# Patient Record
Sex: FEMALE | Race: BLACK OR AFRICAN AMERICAN | Employment: OTHER | URBAN - METROPOLITAN AREA
[De-identification: names, ages, dates, MRNs, and addresses within clinical notes are randomized per-mention and may not be internally consistent; named-entity substitution may affect disease eponyms.]

---

## 2018-07-03 ENCOUNTER — HOSPITAL ENCOUNTER (OUTPATIENT)
Dept: ENDOSCOPY | Age: 73
Discharge: HOME | End: 2018-07-03
Attending: INTERNAL MEDICINE | Admitting: INTERNAL MEDICINE

## 2018-07-03 VITALS
RESPIRATION RATE: 18 BRPM | DIASTOLIC BLOOD PRESSURE: 56 MMHG | HEART RATE: 79 BPM | SYSTOLIC BLOOD PRESSURE: 154 MMHG | TEMPERATURE: 97.9 F | OXYGEN SATURATION: 98 % | BODY MASS INDEX: 24.16 KG/M2 | HEIGHT: 65 IN | WEIGHT: 145 LBS

## 2018-07-03 RX ORDER — PANTOPRAZOLE SODIUM 40 MG/1
1 TABLET, DELAYED RELEASE ORAL DAILY
Status: ON HOLD | COMMUNITY
Start: 2018-02-28 | End: 2019-10-15 | Stop reason: HOSPADM

## 2018-07-03 RX ORDER — FERROUS SULFATE 325(65) MG
1 TABLET ORAL
COMMUNITY
Start: 2018-02-27

## 2018-07-03 RX ORDER — SODIUM BICARBONATE 650 MG/1
2 TABLET ORAL 3 TIMES DAILY
COMMUNITY
Start: 2018-02-27

## 2018-07-03 RX ORDER — DIPHENHYDRAMINE HYDROCHLORIDE 50 MG/ML
12.5 INJECTION INTRAMUSCULAR; INTRAVENOUS
Status: ACTIVE | OUTPATIENT
Start: 2018-07-03 | End: 2018-07-03

## 2018-07-03 RX ORDER — OXYCODONE HYDROCHLORIDE 5 MG/1
10 TABLET ORAL PRN
Status: ACTIVE | OUTPATIENT
Start: 2018-07-03 | End: 2018-07-03

## 2018-07-03 RX ORDER — OXYCODONE HYDROCHLORIDE 5 MG/1
5 TABLET ORAL PRN
Status: ACTIVE | OUTPATIENT
Start: 2018-07-03 | End: 2018-07-03

## 2018-07-03 RX ORDER — PROMETHAZINE HYDROCHLORIDE 25 MG/ML
6.25 INJECTION, SOLUTION INTRAMUSCULAR; INTRAVENOUS
Status: ACTIVE | OUTPATIENT
Start: 2018-07-03 | End: 2018-07-03

## 2018-07-03 RX ORDER — ALLOPURINOL 100 MG/1
1 TABLET ORAL DAILY
COMMUNITY
Start: 2018-04-11

## 2018-07-03 RX ORDER — MORPHINE SULFATE 2 MG/ML
1 INJECTION, SOLUTION INTRAMUSCULAR; INTRAVENOUS EVERY 5 MIN PRN
Status: DISCONTINUED | OUTPATIENT
Start: 2018-07-03 | End: 2018-07-04 | Stop reason: HOSPADM

## 2018-07-03 RX ORDER — METOCLOPRAMIDE HYDROCHLORIDE 5 MG/ML
10 INJECTION INTRAMUSCULAR; INTRAVENOUS
Status: ACTIVE | OUTPATIENT
Start: 2018-07-03 | End: 2018-07-03

## 2018-07-03 RX ORDER — AMLODIPINE BESYLATE 10 MG/1
1 TABLET ORAL 2 TIMES DAILY
COMMUNITY
Start: 2018-02-27

## 2018-07-03 RX ORDER — MEPERIDINE HYDROCHLORIDE 25 MG/ML
12.5 INJECTION INTRAMUSCULAR; INTRAVENOUS; SUBCUTANEOUS EVERY 5 MIN PRN
Status: DISCONTINUED | OUTPATIENT
Start: 2018-07-03 | End: 2018-07-04 | Stop reason: HOSPADM

## 2018-07-03 RX ORDER — LABETALOL HYDROCHLORIDE 5 MG/ML
5 INJECTION, SOLUTION INTRAVENOUS EVERY 10 MIN PRN
Status: DISCONTINUED | OUTPATIENT
Start: 2018-07-03 | End: 2018-07-04 | Stop reason: HOSPADM

## 2018-07-03 RX ORDER — HYDRALAZINE HYDROCHLORIDE 20 MG/ML
5 INJECTION INTRAMUSCULAR; INTRAVENOUS EVERY 10 MIN PRN
Status: DISCONTINUED | OUTPATIENT
Start: 2018-07-03 | End: 2018-07-04 | Stop reason: HOSPADM

## 2018-07-03 NOTE — PROCEDURES
[facility]  ENDOSCOPIC ULTRASOUND (EUS) REPORT    Patient:  Oneta Face    Referring Physician:  Omero Montemayor     Indication:  Gastric nodule     Medications:  MAC      Pre-Anesthesia Assessment:  I have reviewed and am in agreement with patient history and medication, including previous response to sedation in the nursing record. A full history and physical and physical was performed. I discussed the risks and benefits of the procedure with the patient including but not limited to infection, bleeding, perforation, medication reaction, and death. In addition I discussed that fine needle aspiration may result in an increased risk of infection, bleeding, perforation, and pancreatitis. The patient elected to have the procedure performed and informed consent was obtained. The patient was brought to the room, a time out was performed and the patient and staff were in agreement that it was the correct patient and procedure. Mallampatti: II  ASA Grade Assessment:2    The plan was to use MAC anesthesia. Vital signs and heart rhythm were monitored prior to and throughout the entire procedure. The patient was reassessed and then adequate sedation was then provided in stepwise fashion. The heart rate, respiratory rate, oxygen saturations, blood pressure, adequacy of pulmonary ventilation, and response to care were monitored throughout the procedure. The physical status of the patient was re-assessed after the procedure. The gastroscope and Olympus radial echoendoscope were introduced through the mouth, and advanced to the second part of duodenum. The upper EUS was accomplished without difficulty. The patient tolerated the procedure well. An endosonoscope with 7. 5MHz processor and color doppler were used throughout the procedure.   I performed real time sonographic image interpretation without the assistance of a radiologist.     There were no immediate apparent

## 2019-03-15 ENCOUNTER — APPOINTMENT (OUTPATIENT)
Dept: GENERAL RADIOLOGY | Age: 74
End: 2019-03-15
Payer: MEDICARE

## 2019-03-15 LAB
RAPID INFLUENZA  B AGN: NEGATIVE
RAPID INFLUENZA A AGN: NEGATIVE

## 2019-03-15 PROCEDURE — 71046 X-RAY EXAM CHEST 2 VIEWS: CPT

## 2019-03-15 PROCEDURE — 87804 INFLUENZA ASSAY W/OPTIC: CPT

## 2019-03-15 PROCEDURE — 6370000000 HC RX 637 (ALT 250 FOR IP)

## 2019-03-15 RX ORDER — ACETAMINOPHEN 500 MG
1000 TABLET ORAL ONCE
Status: COMPLETED | OUTPATIENT
Start: 2019-03-15 | End: 2019-03-15

## 2019-03-15 RX ORDER — ACETAMINOPHEN 500 MG
TABLET ORAL
Status: COMPLETED
Start: 2019-03-15 | End: 2019-03-15

## 2019-03-15 RX ADMIN — ACETAMINOPHEN 1000 MG: 500 TABLET, FILM COATED ORAL at 23:33

## 2019-03-15 RX ADMIN — Medication 1000 MG: at 23:33

## 2019-03-15 ASSESSMENT — PAIN SCALES - GENERAL
PAINLEVEL_OUTOF10: 8
PAINLEVEL_OUTOF10: 8

## 2019-03-16 ENCOUNTER — APPOINTMENT (OUTPATIENT)
Dept: CT IMAGING | Age: 74
End: 2019-03-16
Payer: MEDICARE

## 2019-03-16 ENCOUNTER — HOSPITAL ENCOUNTER (OUTPATIENT)
Age: 74
Setting detail: OBSERVATION
Discharge: HOME | End: 2019-03-18
Attending: EMERGENCY MEDICINE | Admitting: INTERNAL MEDICINE
Payer: MEDICARE

## 2019-03-16 DIAGNOSIS — R09.02 HYPOXIA: ICD-10-CM

## 2019-03-16 DIAGNOSIS — J06.9 UPPER RESPIRATORY TRACT INFECTION, UNSPECIFIED TYPE: Primary | ICD-10-CM

## 2019-03-16 DIAGNOSIS — N18.9 CHRONIC KIDNEY DISEASE, UNSPECIFIED CKD STAGE: ICD-10-CM

## 2019-03-16 PROBLEM — R68.89 FLU-LIKE SYMPTOMS: Status: ACTIVE | Noted: 2019-03-16

## 2019-03-16 LAB
A/G RATIO: 1.2 (ref 1.1–2.2)
ALBUMIN SERPL-MCNC: 3.5 G/DL (ref 3.4–5)
ALP BLD-CCNC: 133 U/L (ref 40–129)
ALT SERPL-CCNC: 12 U/L (ref 10–40)
ANION GAP SERPL CALCULATED.3IONS-SCNC: 14 MMOL/L (ref 3–16)
AST SERPL-CCNC: 21 U/L (ref 15–37)
BASOPHILS ABSOLUTE: 0.2 K/UL (ref 0–0.2)
BASOPHILS RELATIVE PERCENT: 2.8 %
BILIRUB SERPL-MCNC: <0.2 MG/DL (ref 0–1)
BUN BLDV-MCNC: 31 MG/DL (ref 7–20)
CALCIUM SERPL-MCNC: 7.8 MG/DL (ref 8.3–10.6)
CHLORIDE BLD-SCNC: 102 MMOL/L (ref 99–110)
CO2: 20 MMOL/L (ref 21–32)
CREAT SERPL-MCNC: 2.6 MG/DL (ref 0.6–1.2)
EOSINOPHILS ABSOLUTE: 0.1 K/UL (ref 0–0.6)
EOSINOPHILS RELATIVE PERCENT: 1.6 %
GFR AFRICAN AMERICAN: 22
GFR NON-AFRICAN AMERICAN: 18
GLOBULIN: 2.9 G/DL
GLUCOSE BLD-MCNC: 100 MG/DL (ref 70–99)
HCT VFR BLD CALC: 28.1 % (ref 36–48)
HEMOGLOBIN: 9.1 G/DL (ref 12–16)
LACTIC ACID, SEPSIS: 0.6 MMOL/L (ref 0.4–1.9)
LYMPHOCYTES ABSOLUTE: 0.7 K/UL (ref 1–5.1)
LYMPHOCYTES RELATIVE PERCENT: 10.6 %
MCH RBC QN AUTO: 27.5 PG (ref 26–34)
MCHC RBC AUTO-ENTMCNC: 32.5 G/DL (ref 31–36)
MCV RBC AUTO: 84.5 FL (ref 80–100)
MONOCYTES ABSOLUTE: 0.8 K/UL (ref 0–1.3)
MONOCYTES RELATIVE PERCENT: 12.3 %
NEUTROPHILS ABSOLUTE: 4.7 K/UL (ref 1.7–7.7)
NEUTROPHILS RELATIVE PERCENT: 72.7 %
PDW BLD-RTO: 16 % (ref 12.4–15.4)
PLATELET # BLD: 209 K/UL (ref 135–450)
PMV BLD AUTO: 9.7 FL (ref 5–10.5)
POTASSIUM SERPL-SCNC: 4.2 MMOL/L (ref 3.5–5.1)
RBC # BLD: 3.33 M/UL (ref 4–5.2)
SODIUM BLD-SCNC: 136 MMOL/L (ref 136–145)
TOTAL PROTEIN: 6.4 G/DL (ref 6.4–8.2)
WBC # BLD: 6.5 K/UL (ref 4–11)

## 2019-03-16 PROCEDURE — 85025 COMPLETE CBC W/AUTO DIFF WBC: CPT

## 2019-03-16 PROCEDURE — 6370000000 HC RX 637 (ALT 250 FOR IP): Performed by: INTERNAL MEDICINE

## 2019-03-16 PROCEDURE — 99285 EMERGENCY DEPT VISIT HI MDM: CPT

## 2019-03-16 PROCEDURE — 83605 ASSAY OF LACTIC ACID: CPT

## 2019-03-16 PROCEDURE — 96360 HYDRATION IV INFUSION INIT: CPT

## 2019-03-16 PROCEDURE — 2580000003 HC RX 258: Performed by: NURSE PRACTITIONER

## 2019-03-16 PROCEDURE — 71250 CT THORAX DX C-: CPT

## 2019-03-16 PROCEDURE — G0378 HOSPITAL OBSERVATION PER HR: HCPCS

## 2019-03-16 PROCEDURE — 87040 BLOOD CULTURE FOR BACTERIA: CPT

## 2019-03-16 PROCEDURE — 6360000002 HC RX W HCPCS: Performed by: INTERNAL MEDICINE

## 2019-03-16 PROCEDURE — 2580000003 HC RX 258: Performed by: INTERNAL MEDICINE

## 2019-03-16 PROCEDURE — 96372 THER/PROPH/DIAG INJ SC/IM: CPT

## 2019-03-16 PROCEDURE — 80053 COMPREHEN METABOLIC PANEL: CPT

## 2019-03-16 RX ORDER — FERROUS SULFATE 325(65) MG
325 TABLET ORAL DAILY
Status: DISCONTINUED | OUTPATIENT
Start: 2019-03-16 | End: 2019-03-16

## 2019-03-16 RX ORDER — GUAIFENESIN/DEXTROMETHORPHAN 100-10MG/5
5 SYRUP ORAL 3 TIMES DAILY PRN
Qty: 120 ML | Refills: 0 | Status: SHIPPED | OUTPATIENT
Start: 2019-03-16 | End: 2019-03-18 | Stop reason: SDUPTHER

## 2019-03-16 RX ORDER — 0.9 % SODIUM CHLORIDE 0.9 %
1000 INTRAVENOUS SOLUTION INTRAVENOUS ONCE
Status: COMPLETED | OUTPATIENT
Start: 2019-03-16 | End: 2019-03-16

## 2019-03-16 RX ORDER — SODIUM CHLORIDE 9 MG/ML
INJECTION, SOLUTION INTRAVENOUS CONTINUOUS
Status: DISPENSED | OUTPATIENT
Start: 2019-03-16 | End: 2019-03-16

## 2019-03-16 RX ORDER — AMLODIPINE BESYLATE 5 MG/1
10 TABLET ORAL 2 TIMES DAILY
Status: DISCONTINUED | OUTPATIENT
Start: 2019-03-16 | End: 2019-03-18 | Stop reason: HOSPADM

## 2019-03-16 RX ORDER — SODIUM BICARBONATE 650 MG/1
1300 TABLET ORAL 3 TIMES DAILY
Status: DISCONTINUED | OUTPATIENT
Start: 2019-03-16 | End: 2019-03-18 | Stop reason: HOSPADM

## 2019-03-16 RX ORDER — ONDANSETRON 2 MG/ML
4 INJECTION INTRAMUSCULAR; INTRAVENOUS EVERY 6 HOURS PRN
Status: DISCONTINUED | OUTPATIENT
Start: 2019-03-16 | End: 2019-03-18 | Stop reason: HOSPADM

## 2019-03-16 RX ORDER — FERROUS SULFATE 325(65) MG
325 TABLET ORAL
Status: DISCONTINUED | OUTPATIENT
Start: 2019-03-16 | End: 2019-03-18 | Stop reason: HOSPADM

## 2019-03-16 RX ORDER — ACETAMINOPHEN 325 MG/1
650 TABLET ORAL EVERY 4 HOURS PRN
Status: DISCONTINUED | OUTPATIENT
Start: 2019-03-16 | End: 2019-03-18 | Stop reason: HOSPADM

## 2019-03-16 RX ORDER — PREDNISONE 10 MG/1
60 TABLET ORAL DAILY
Qty: 30 TABLET | Refills: 0 | Status: SHIPPED | OUTPATIENT
Start: 2019-03-16 | End: 2019-03-18 | Stop reason: HOSPADM

## 2019-03-16 RX ORDER — SODIUM CHLORIDE 0.9 % (FLUSH) 0.9 %
10 SYRINGE (ML) INJECTION PRN
Status: DISCONTINUED | OUTPATIENT
Start: 2019-03-16 | End: 2019-03-18 | Stop reason: HOSPADM

## 2019-03-16 RX ORDER — OSELTAMIVIR PHOSPHATE 30 MG/1
30 CAPSULE ORAL DAILY
Status: DISCONTINUED | OUTPATIENT
Start: 2019-03-16 | End: 2019-03-18 | Stop reason: HOSPADM

## 2019-03-16 RX ORDER — ALBUTEROL SULFATE 90 UG/1
2 AEROSOL, METERED RESPIRATORY (INHALATION) EVERY 4 HOURS PRN
Qty: 1 INHALER | Refills: 0 | Status: SHIPPED | OUTPATIENT
Start: 2019-03-16 | End: 2019-03-18 | Stop reason: HOSPADM

## 2019-03-16 RX ORDER — PANTOPRAZOLE SODIUM 40 MG/1
40 TABLET, DELAYED RELEASE ORAL DAILY
Status: DISCONTINUED | OUTPATIENT
Start: 2019-03-16 | End: 2019-03-18 | Stop reason: HOSPADM

## 2019-03-16 RX ORDER — LISINOPRIL 20 MG/1
40 TABLET ORAL DAILY
Status: DISCONTINUED | OUTPATIENT
Start: 2019-03-16 | End: 2019-03-18 | Stop reason: HOSPADM

## 2019-03-16 RX ORDER — ALLOPURINOL 100 MG/1
100 TABLET ORAL DAILY
Status: DISCONTINUED | OUTPATIENT
Start: 2019-03-16 | End: 2019-03-18 | Stop reason: HOSPADM

## 2019-03-16 RX ORDER — SODIUM CHLORIDE 0.9 % (FLUSH) 0.9 %
10 SYRINGE (ML) INJECTION EVERY 12 HOURS SCHEDULED
Status: DISCONTINUED | OUTPATIENT
Start: 2019-03-16 | End: 2019-03-18 | Stop reason: HOSPADM

## 2019-03-16 RX ORDER — ATORVASTATIN CALCIUM 40 MG/1
40 TABLET, FILM COATED ORAL DAILY
Status: DISCONTINUED | OUTPATIENT
Start: 2019-03-16 | End: 2019-03-18 | Stop reason: HOSPADM

## 2019-03-16 RX ADMIN — SODIUM CHLORIDE 1000 ML: 9 INJECTION, SOLUTION INTRAVENOUS at 01:09

## 2019-03-16 RX ADMIN — AMLODIPINE BESYLATE 10 MG: 5 TABLET ORAL at 21:38

## 2019-03-16 RX ADMIN — VITAMIN D, TAB 1000IU (100/BT) 2000 UNITS: 25 TAB at 11:14

## 2019-03-16 RX ADMIN — Medication 10 ML: at 11:14

## 2019-03-16 RX ADMIN — Medication 10 ML: at 21:39

## 2019-03-16 RX ADMIN — FERROUS SULFATE TAB 325 MG (65 MG ELEMENTAL FE) 325 MG: 325 (65 FE) TAB at 19:12

## 2019-03-16 RX ADMIN — PANTOPRAZOLE SODIUM 40 MG: 40 TABLET, DELAYED RELEASE ORAL at 11:14

## 2019-03-16 RX ADMIN — AMLODIPINE BESYLATE 10 MG: 5 TABLET ORAL at 11:13

## 2019-03-16 RX ADMIN — ACETAMINOPHEN 650 MG: 325 TABLET, FILM COATED ORAL at 14:29

## 2019-03-16 RX ADMIN — ENOXAPARIN SODIUM 30 MG: 30 INJECTION SUBCUTANEOUS at 11:14

## 2019-03-16 RX ADMIN — SODIUM CHLORIDE: 9 INJECTION, SOLUTION INTRAVENOUS at 11:15

## 2019-03-16 RX ADMIN — FERROUS SULFATE TAB 325 MG (65 MG ELEMENTAL FE) 325 MG: 325 (65 FE) TAB at 11:13

## 2019-03-16 RX ADMIN — OSELTAMIVIR PHOSPHATE 30 MG: 30 CAPSULE ORAL at 11:13

## 2019-03-16 RX ADMIN — SODIUM BICARBONATE 1300 MG: 650 TABLET ORAL at 21:38

## 2019-03-16 RX ADMIN — LISINOPRIL 40 MG: 20 TABLET ORAL at 11:13

## 2019-03-16 RX ADMIN — DESMOPRESSIN ACETATE 40 MG: 0.2 TABLET ORAL at 11:13

## 2019-03-16 RX ADMIN — SODIUM BICARBONATE 1300 MG: 650 TABLET ORAL at 11:13

## 2019-03-16 RX ADMIN — ALLOPURINOL 100 MG: 100 TABLET ORAL at 11:14

## 2019-03-16 RX ADMIN — SODIUM BICARBONATE 1300 MG: 650 TABLET ORAL at 14:29

## 2019-03-16 ASSESSMENT — ENCOUNTER SYMPTOMS
SHORTNESS OF BREATH: 1
NAUSEA: 0
ABDOMINAL PAIN: 0
DIARRHEA: 0
COUGH: 1
CHEST TIGHTNESS: 1
VOMITING: 0

## 2019-03-16 ASSESSMENT — PAIN SCALES - GENERAL
PAINLEVEL_OUTOF10: 0

## 2019-03-17 PROBLEM — I10 ESSENTIAL HYPERTENSION: Status: ACTIVE | Noted: 2019-03-17

## 2019-03-17 PROBLEM — N19 RENAL FAILURE: Status: ACTIVE | Noted: 2019-03-17

## 2019-03-17 PROBLEM — E78.5 HYPERLIPIDEMIA: Status: ACTIVE | Noted: 2019-03-17

## 2019-03-17 LAB
ANION GAP SERPL CALCULATED.3IONS-SCNC: 13 MMOL/L (ref 3–16)
BASOPHILS ABSOLUTE: 0.1 K/UL (ref 0–0.2)
BASOPHILS RELATIVE PERCENT: 1.1 %
BILIRUBIN URINE: NEGATIVE
BLOOD, URINE: ABNORMAL
BUN BLDV-MCNC: 28 MG/DL (ref 7–20)
CALCIUM SERPL-MCNC: 7.8 MG/DL (ref 8.3–10.6)
CHLORIDE BLD-SCNC: 107 MMOL/L (ref 99–110)
CLARITY: CLEAR
CO2: 21 MMOL/L (ref 21–32)
COLOR: YELLOW
CREAT SERPL-MCNC: 2.2 MG/DL (ref 0.6–1.2)
EOSINOPHILS ABSOLUTE: 0.1 K/UL (ref 0–0.6)
EOSINOPHILS RELATIVE PERCENT: 2.2 %
EPITHELIAL CELLS, UA: 1 /HPF (ref 0–5)
GFR AFRICAN AMERICAN: 26
GFR NON-AFRICAN AMERICAN: 22
GLUCOSE BLD-MCNC: 88 MG/DL (ref 70–99)
GLUCOSE URINE: NEGATIVE MG/DL
HCT VFR BLD CALC: 26.4 % (ref 36–48)
HEMOGLOBIN: 8.7 G/DL (ref 12–16)
HYALINE CASTS: 0 /LPF (ref 0–8)
KETONES, URINE: NEGATIVE MG/DL
LEUKOCYTE ESTERASE, URINE: NEGATIVE
LYMPHOCYTES ABSOLUTE: 1.5 K/UL (ref 1–5.1)
LYMPHOCYTES RELATIVE PERCENT: 30 %
MCH RBC QN AUTO: 27.7 PG (ref 26–34)
MCHC RBC AUTO-ENTMCNC: 32.8 G/DL (ref 31–36)
MCV RBC AUTO: 84.6 FL (ref 80–100)
MICROSCOPIC EXAMINATION: YES
MONOCYTES ABSOLUTE: 0.9 K/UL (ref 0–1.3)
MONOCYTES RELATIVE PERCENT: 16.6 %
NEUTROPHILS ABSOLUTE: 2.6 K/UL (ref 1.7–7.7)
NEUTROPHILS RELATIVE PERCENT: 50.1 %
NITRITE, URINE: NEGATIVE
PDW BLD-RTO: 16 % (ref 12.4–15.4)
PH UA: 7 (ref 5–8)
PLATELET # BLD: 184 K/UL (ref 135–450)
PMV BLD AUTO: 9.6 FL (ref 5–10.5)
POTASSIUM REFLEX MAGNESIUM: 4.2 MMOL/L (ref 3.5–5.1)
PROTEIN UA: >=300 MG/DL
RBC # BLD: 3.12 M/UL (ref 4–5.2)
RBC UA: 2 /HPF (ref 0–4)
SODIUM BLD-SCNC: 141 MMOL/L (ref 136–145)
SPECIFIC GRAVITY UA: 1.01 (ref 1–1.03)
TOTAL CK: 69 U/L (ref 26–192)
URINE TYPE: ABNORMAL
UROBILINOGEN, URINE: 0.2 E.U./DL
WBC # BLD: 5.1 K/UL (ref 4–11)
WBC UA: 0 /HPF (ref 0–5)

## 2019-03-17 PROCEDURE — 85025 COMPLETE CBC W/AUTO DIFF WBC: CPT

## 2019-03-17 PROCEDURE — 36415 COLL VENOUS BLD VENIPUNCTURE: CPT

## 2019-03-17 PROCEDURE — 80048 BASIC METABOLIC PNL TOTAL CA: CPT

## 2019-03-17 PROCEDURE — 6360000002 HC RX W HCPCS: Performed by: INTERNAL MEDICINE

## 2019-03-17 PROCEDURE — 6370000000 HC RX 637 (ALT 250 FOR IP): Performed by: INTERNAL MEDICINE

## 2019-03-17 PROCEDURE — 96372 THER/PROPH/DIAG INJ SC/IM: CPT

## 2019-03-17 PROCEDURE — G0378 HOSPITAL OBSERVATION PER HR: HCPCS

## 2019-03-17 PROCEDURE — 81001 URINALYSIS AUTO W/SCOPE: CPT

## 2019-03-17 PROCEDURE — 2580000003 HC RX 258: Performed by: INTERNAL MEDICINE

## 2019-03-17 PROCEDURE — 84300 ASSAY OF URINE SODIUM: CPT

## 2019-03-17 PROCEDURE — 84156 ASSAY OF PROTEIN URINE: CPT

## 2019-03-17 PROCEDURE — 82550 ASSAY OF CK (CPK): CPT

## 2019-03-17 PROCEDURE — 82570 ASSAY OF URINE CREATININE: CPT

## 2019-03-17 RX ADMIN — VITAMIN D, TAB 1000IU (100/BT) 2000 UNITS: 25 TAB at 09:51

## 2019-03-17 RX ADMIN — ALLOPURINOL 100 MG: 100 TABLET ORAL at 09:50

## 2019-03-17 RX ADMIN — LISINOPRIL 40 MG: 20 TABLET ORAL at 09:51

## 2019-03-17 RX ADMIN — ENOXAPARIN SODIUM 30 MG: 30 INJECTION SUBCUTANEOUS at 09:50

## 2019-03-17 RX ADMIN — SODIUM BICARBONATE 1300 MG: 650 TABLET ORAL at 22:25

## 2019-03-17 RX ADMIN — AMLODIPINE BESYLATE 10 MG: 5 TABLET ORAL at 22:26

## 2019-03-17 RX ADMIN — DESMOPRESSIN ACETATE 40 MG: 0.2 TABLET ORAL at 09:50

## 2019-03-17 RX ADMIN — Medication 10 ML: at 09:51

## 2019-03-17 RX ADMIN — FERROUS SULFATE TAB 325 MG (65 MG ELEMENTAL FE) 325 MG: 325 (65 FE) TAB at 17:46

## 2019-03-17 RX ADMIN — OSELTAMIVIR PHOSPHATE 30 MG: 30 CAPSULE ORAL at 09:54

## 2019-03-17 RX ADMIN — PANTOPRAZOLE SODIUM 40 MG: 40 TABLET, DELAYED RELEASE ORAL at 09:51

## 2019-03-17 RX ADMIN — Medication 10 ML: at 22:26

## 2019-03-17 RX ADMIN — FERROUS SULFATE TAB 325 MG (65 MG ELEMENTAL FE) 325 MG: 325 (65 FE) TAB at 09:50

## 2019-03-17 RX ADMIN — FERROUS SULFATE TAB 325 MG (65 MG ELEMENTAL FE) 325 MG: 325 (65 FE) TAB at 14:36

## 2019-03-17 RX ADMIN — AMLODIPINE BESYLATE 10 MG: 5 TABLET ORAL at 09:50

## 2019-03-17 RX ADMIN — SODIUM BICARBONATE 1300 MG: 650 TABLET ORAL at 14:36

## 2019-03-17 RX ADMIN — SODIUM BICARBONATE 1300 MG: 650 TABLET ORAL at 09:50

## 2019-03-18 VITALS
HEIGHT: 63 IN | WEIGHT: 150.7 LBS | TEMPERATURE: 98.5 F | RESPIRATION RATE: 18 BRPM | OXYGEN SATURATION: 96 % | SYSTOLIC BLOOD PRESSURE: 128 MMHG | DIASTOLIC BLOOD PRESSURE: 81 MMHG | BODY MASS INDEX: 26.7 KG/M2 | HEART RATE: 84 BPM

## 2019-03-18 LAB
ALBUMIN SERPL-MCNC: 3.1 G/DL (ref 3.4–5)
ANION GAP SERPL CALCULATED.3IONS-SCNC: 11 MMOL/L (ref 3–16)
BASOPHILS ABSOLUTE: 0.1 K/UL (ref 0–0.2)
BASOPHILS RELATIVE PERCENT: 1 %
BUN BLDV-MCNC: 27 MG/DL (ref 7–20)
CALCIUM SERPL-MCNC: 7.7 MG/DL (ref 8.3–10.6)
CHLORIDE BLD-SCNC: 108 MMOL/L (ref 99–110)
CO2: 22 MMOL/L (ref 21–32)
CREAT SERPL-MCNC: 1.9 MG/DL (ref 0.6–1.2)
CREATININE URINE: 56.9 MG/DL (ref 28–259)
EOSINOPHILS ABSOLUTE: 0.2 K/UL (ref 0–0.6)
EOSINOPHILS RELATIVE PERCENT: 3.7 %
GFR AFRICAN AMERICAN: 31
GFR NON-AFRICAN AMERICAN: 26
GLUCOSE BLD-MCNC: 88 MG/DL (ref 70–99)
HCT VFR BLD CALC: 27.3 % (ref 36–48)
HEMOGLOBIN: 8.8 G/DL (ref 12–16)
LYMPHOCYTES ABSOLUTE: 2.2 K/UL (ref 1–5.1)
LYMPHOCYTES RELATIVE PERCENT: 45.3 %
MCH RBC QN AUTO: 27 PG (ref 26–34)
MCHC RBC AUTO-ENTMCNC: 32.3 G/DL (ref 31–36)
MCV RBC AUTO: 83.4 FL (ref 80–100)
MONOCYTES ABSOLUTE: 0.6 K/UL (ref 0–1.3)
MONOCYTES RELATIVE PERCENT: 12.3 %
NEUTROPHILS ABSOLUTE: 1.9 K/UL (ref 1.7–7.7)
NEUTROPHILS RELATIVE PERCENT: 37.7 %
PDW BLD-RTO: 15.8 % (ref 12.4–15.4)
PHOSPHORUS: 3.9 MG/DL (ref 2.5–4.9)
PLATELET # BLD: 186 K/UL (ref 135–450)
PMV BLD AUTO: 9.6 FL (ref 5–10.5)
POTASSIUM SERPL-SCNC: 3.8 MMOL/L (ref 3.5–5.1)
PROTEIN PROTEIN: 202 MG/DL
RBC # BLD: 3.27 M/UL (ref 4–5.2)
SODIUM BLD-SCNC: 141 MMOL/L (ref 136–145)
SODIUM URINE: 57 MMOL/L
WBC # BLD: 4.9 K/UL (ref 4–11)

## 2019-03-18 PROCEDURE — 6360000002 HC RX W HCPCS: Performed by: INTERNAL MEDICINE

## 2019-03-18 PROCEDURE — 85025 COMPLETE CBC W/AUTO DIFF WBC: CPT

## 2019-03-18 PROCEDURE — 36415 COLL VENOUS BLD VENIPUNCTURE: CPT

## 2019-03-18 PROCEDURE — G0378 HOSPITAL OBSERVATION PER HR: HCPCS

## 2019-03-18 PROCEDURE — 2580000003 HC RX 258: Performed by: INTERNAL MEDICINE

## 2019-03-18 PROCEDURE — 96372 THER/PROPH/DIAG INJ SC/IM: CPT

## 2019-03-18 PROCEDURE — 6370000000 HC RX 637 (ALT 250 FOR IP): Performed by: INTERNAL MEDICINE

## 2019-03-18 PROCEDURE — 80069 RENAL FUNCTION PANEL: CPT

## 2019-03-18 RX ORDER — GUAIFENESIN/DEXTROMETHORPHAN 100-10MG/5
5 SYRUP ORAL 3 TIMES DAILY PRN
Qty: 120 ML | Refills: 0 | Status: SHIPPED | OUTPATIENT
Start: 2019-03-18 | End: 2019-03-28

## 2019-03-18 RX ORDER — OSELTAMIVIR PHOSPHATE 30 MG/1
30 CAPSULE ORAL DAILY
Qty: 3 CAPSULE | Refills: 0 | Status: SHIPPED | OUTPATIENT
Start: 2019-03-18 | End: 2019-03-21

## 2019-03-18 RX ORDER — VARENICLINE TARTRATE 25 MG
KIT ORAL
Qty: 1 EACH | Refills: 0 | Status: SHIPPED | OUTPATIENT
Start: 2019-03-18 | End: 2019-12-10

## 2019-03-18 RX ADMIN — VITAMIN D, TAB 1000IU (100/BT) 2000 UNITS: 25 TAB at 09:23

## 2019-03-18 RX ADMIN — LISINOPRIL 40 MG: 20 TABLET ORAL at 09:23

## 2019-03-18 RX ADMIN — SODIUM BICARBONATE 1300 MG: 650 TABLET ORAL at 13:15

## 2019-03-18 RX ADMIN — SODIUM BICARBONATE 1300 MG: 650 TABLET ORAL at 09:23

## 2019-03-18 RX ADMIN — FERROUS SULFATE TAB 325 MG (65 MG ELEMENTAL FE) 325 MG: 325 (65 FE) TAB at 13:16

## 2019-03-18 RX ADMIN — ENOXAPARIN SODIUM 30 MG: 30 INJECTION SUBCUTANEOUS at 09:22

## 2019-03-18 RX ADMIN — OSELTAMIVIR PHOSPHATE 30 MG: 30 CAPSULE ORAL at 09:23

## 2019-03-18 RX ADMIN — AMLODIPINE BESYLATE 10 MG: 5 TABLET ORAL at 09:23

## 2019-03-18 RX ADMIN — FERROUS SULFATE TAB 325 MG (65 MG ELEMENTAL FE) 325 MG: 325 (65 FE) TAB at 09:23

## 2019-03-18 RX ADMIN — Medication 10 ML: at 09:22

## 2019-03-18 RX ADMIN — ALLOPURINOL 100 MG: 100 TABLET ORAL at 09:23

## 2019-03-18 RX ADMIN — PANTOPRAZOLE SODIUM 40 MG: 40 TABLET, DELAYED RELEASE ORAL at 09:23

## 2019-03-18 ASSESSMENT — PAIN SCALES - GENERAL
PAINLEVEL_OUTOF10: 0
PAINLEVEL_OUTOF10: 0

## 2019-03-21 LAB
BLOOD CULTURE, ROUTINE: NORMAL
CULTURE, BLOOD 2: NORMAL

## 2019-10-05 ENCOUNTER — APPOINTMENT (OUTPATIENT)
Dept: CT IMAGING | Age: 74
DRG: 280 | End: 2019-10-05
Payer: MEDICARE

## 2019-10-05 ENCOUNTER — HOSPITAL ENCOUNTER (INPATIENT)
Age: 74
LOS: 10 days | Discharge: HOME | DRG: 280 | End: 2019-10-15
Attending: EMERGENCY MEDICINE | Admitting: INTERNAL MEDICINE
Payer: MEDICARE

## 2019-10-05 ENCOUNTER — APPOINTMENT (OUTPATIENT)
Dept: GENERAL RADIOLOGY | Age: 74
DRG: 280 | End: 2019-10-05
Payer: MEDICARE

## 2019-10-05 DIAGNOSIS — E86.0 DEHYDRATION: ICD-10-CM

## 2019-10-05 DIAGNOSIS — R77.8 ELEVATED TROPONIN: ICD-10-CM

## 2019-10-05 DIAGNOSIS — I21.4 NSTEMI (NON-ST ELEVATED MYOCARDIAL INFARCTION) (HCC): ICD-10-CM

## 2019-10-05 DIAGNOSIS — R19.7 NAUSEA VOMITING AND DIARRHEA: Primary | ICD-10-CM

## 2019-10-05 DIAGNOSIS — R11.2 NAUSEA VOMITING AND DIARRHEA: Primary | ICD-10-CM

## 2019-10-05 DIAGNOSIS — I95.9 HYPOTENSION, UNSPECIFIED HYPOTENSION TYPE: ICD-10-CM

## 2019-10-05 DIAGNOSIS — D35.00 ADRENAL ADENOMA, UNSPECIFIED LATERALITY: ICD-10-CM

## 2019-10-05 DIAGNOSIS — R94.31 ABNORMAL EKG: ICD-10-CM

## 2019-10-05 DIAGNOSIS — N17.9 ACUTE RENAL FAILURE SUPERIMPOSED ON STAGE 4 CHRONIC KIDNEY DISEASE, UNSPECIFIED ACUTE RENAL FAILURE TYPE (HCC): ICD-10-CM

## 2019-10-05 DIAGNOSIS — N18.4 ACUTE RENAL FAILURE SUPERIMPOSED ON STAGE 4 CHRONIC KIDNEY DISEASE, UNSPECIFIED ACUTE RENAL FAILURE TYPE (HCC): ICD-10-CM

## 2019-10-05 LAB
A/G RATIO: 0.9 (ref 1.1–2.2)
ALBUMIN SERPL-MCNC: 3.9 G/DL (ref 3.4–5)
ALP BLD-CCNC: 97 U/L (ref 40–129)
ALT SERPL-CCNC: 12 U/L (ref 10–40)
ANION GAP SERPL CALCULATED.3IONS-SCNC: 14 MMOL/L (ref 3–16)
APTT: 30.3 SEC (ref 26–36)
AST SERPL-CCNC: 63 U/L (ref 15–37)
BASOPHILS ABSOLUTE: 0 K/UL (ref 0–0.2)
BASOPHILS RELATIVE PERCENT: 0.4 %
BILIRUB SERPL-MCNC: 0.3 MG/DL (ref 0–1)
BUN BLDV-MCNC: 41 MG/DL (ref 7–20)
CALCIUM SERPL-MCNC: 8.6 MG/DL (ref 8.3–10.6)
CHLORIDE BLD-SCNC: 102 MMOL/L (ref 99–110)
CO2: 15 MMOL/L (ref 21–32)
CREAT SERPL-MCNC: 3.9 MG/DL (ref 0.6–1.2)
EKG ATRIAL RATE: 98 BPM
EKG DIAGNOSIS: NORMAL
EKG P AXIS: 71 DEGREES
EKG P-R INTERVAL: 160 MS
EKG Q-T INTERVAL: 428 MS
EKG QRS DURATION: 76 MS
EKG QTC CALCULATION (BAZETT): 546 MS
EKG R AXIS: 49 DEGREES
EKG T AXIS: 11 DEGREES
EKG VENTRICULAR RATE: 98 BPM
EOSINOPHILS ABSOLUTE: 0 K/UL (ref 0–0.6)
EOSINOPHILS RELATIVE PERCENT: 0.2 %
GFR AFRICAN AMERICAN: 14
GFR NON-AFRICAN AMERICAN: 11
GLOBULIN: 4.3 G/DL
GLUCOSE BLD-MCNC: 151 MG/DL (ref 70–99)
HCT VFR BLD CALC: 35 % (ref 36–48)
HEMOGLOBIN: 11 G/DL (ref 12–16)
LACTIC ACID, SEPSIS: 1.9 MMOL/L (ref 0.4–1.9)
LIPASE: 30 U/L (ref 13–60)
LYMPHOCYTES ABSOLUTE: 1 K/UL (ref 1–5.1)
LYMPHOCYTES RELATIVE PERCENT: 14.1 %
MCH RBC QN AUTO: 28.2 PG (ref 26–34)
MCHC RBC AUTO-ENTMCNC: 31.3 G/DL (ref 31–36)
MCV RBC AUTO: 90.1 FL (ref 80–100)
MONOCYTES ABSOLUTE: 0.6 K/UL (ref 0–1.3)
MONOCYTES RELATIVE PERCENT: 7.8 %
NEUTROPHILS ABSOLUTE: 5.7 K/UL (ref 1.7–7.7)
NEUTROPHILS RELATIVE PERCENT: 77.5 %
PDW BLD-RTO: 16.1 % (ref 12.4–15.4)
PLATELET # BLD: 230 K/UL (ref 135–450)
PMV BLD AUTO: 10.4 FL (ref 5–10.5)
POTASSIUM REFLEX MAGNESIUM: 5.2 MMOL/L (ref 3.5–5.1)
RAPID INFLUENZA  B AGN: NEGATIVE
RAPID INFLUENZA A AGN: NEGATIVE
RBC # BLD: 3.89 M/UL (ref 4–5.2)
SODIUM BLD-SCNC: 131 MMOL/L (ref 136–145)
TOTAL PROTEIN: 8.2 G/DL (ref 6.4–8.2)
TROPONIN: 0.62 NG/ML
TROPONIN: 0.73 NG/ML
WBC # BLD: 7.3 K/UL (ref 4–11)

## 2019-10-05 PROCEDURE — 96375 TX/PRO/DX INJ NEW DRUG ADDON: CPT

## 2019-10-05 PROCEDURE — 6360000002 HC RX W HCPCS: Performed by: INTERNAL MEDICINE

## 2019-10-05 PROCEDURE — 93005 ELECTROCARDIOGRAM TRACING: CPT | Performed by: EMERGENCY MEDICINE

## 2019-10-05 PROCEDURE — 6370000000 HC RX 637 (ALT 250 FOR IP): Performed by: EMERGENCY MEDICINE

## 2019-10-05 PROCEDURE — 87804 INFLUENZA ASSAY W/OPTIC: CPT

## 2019-10-05 PROCEDURE — 71045 X-RAY EXAM CHEST 1 VIEW: CPT

## 2019-10-05 PROCEDURE — 84484 ASSAY OF TROPONIN QUANT: CPT

## 2019-10-05 PROCEDURE — 2580000003 HC RX 258: Performed by: EMERGENCY MEDICINE

## 2019-10-05 PROCEDURE — 87040 BLOOD CULTURE FOR BACTERIA: CPT

## 2019-10-05 PROCEDURE — 83605 ASSAY OF LACTIC ACID: CPT

## 2019-10-05 PROCEDURE — 74176 CT ABD & PELVIS W/O CONTRAST: CPT

## 2019-10-05 PROCEDURE — 99285 EMERGENCY DEPT VISIT HI MDM: CPT

## 2019-10-05 PROCEDURE — 6360000002 HC RX W HCPCS: Performed by: PHYSICIAN ASSISTANT

## 2019-10-05 PROCEDURE — 96365 THER/PROPH/DIAG IV INF INIT: CPT

## 2019-10-05 PROCEDURE — 85025 COMPLETE CBC W/AUTO DIFF WBC: CPT

## 2019-10-05 PROCEDURE — 36415 COLL VENOUS BLD VENIPUNCTURE: CPT

## 2019-10-05 PROCEDURE — 1200000000 HC SEMI PRIVATE

## 2019-10-05 PROCEDURE — 6370000000 HC RX 637 (ALT 250 FOR IP): Performed by: PHYSICIAN ASSISTANT

## 2019-10-05 PROCEDURE — 83690 ASSAY OF LIPASE: CPT

## 2019-10-05 PROCEDURE — 96361 HYDRATE IV INFUSION ADD-ON: CPT

## 2019-10-05 PROCEDURE — 85730 THROMBOPLASTIN TIME PARTIAL: CPT

## 2019-10-05 PROCEDURE — 80053 COMPREHEN METABOLIC PANEL: CPT

## 2019-10-05 PROCEDURE — 93010 ELECTROCARDIOGRAM REPORT: CPT | Performed by: INTERNAL MEDICINE

## 2019-10-05 PROCEDURE — 2580000003 HC RX 258: Performed by: INTERNAL MEDICINE

## 2019-10-05 PROCEDURE — 6360000002 HC RX W HCPCS: Performed by: EMERGENCY MEDICINE

## 2019-10-05 RX ORDER — SODIUM CHLORIDE 0.9 % (FLUSH) 0.9 %
10 SYRINGE (ML) INJECTION PRN
Status: DISCONTINUED | OUTPATIENT
Start: 2019-10-05 | End: 2019-10-15 | Stop reason: HOSPADM

## 2019-10-05 RX ORDER — ONDANSETRON 2 MG/ML
4 INJECTION INTRAMUSCULAR; INTRAVENOUS EVERY 6 HOURS PRN
Status: DISCONTINUED | OUTPATIENT
Start: 2019-10-05 | End: 2019-10-06

## 2019-10-05 RX ORDER — FERROUS SULFATE 325(65) MG
325 TABLET ORAL
Status: DISCONTINUED | OUTPATIENT
Start: 2019-10-06 | End: 2019-10-15 | Stop reason: HOSPADM

## 2019-10-05 RX ORDER — HEPARIN SODIUM 10000 [USP'U]/100ML
18 INJECTION, SOLUTION INTRAVENOUS CONTINUOUS
Status: DISCONTINUED | OUTPATIENT
Start: 2019-10-05 | End: 2019-10-10

## 2019-10-05 RX ORDER — SODIUM CHLORIDE 0.9 % (FLUSH) 0.9 %
10 SYRINGE (ML) INJECTION EVERY 12 HOURS SCHEDULED
Status: DISCONTINUED | OUTPATIENT
Start: 2019-10-05 | End: 2019-10-15 | Stop reason: HOSPADM

## 2019-10-05 RX ORDER — HEPARIN SODIUM 1000 [USP'U]/ML
80 INJECTION, SOLUTION INTRAVENOUS; SUBCUTANEOUS PRN
Status: DISCONTINUED | OUTPATIENT
Start: 2019-10-05 | End: 2019-10-10

## 2019-10-05 RX ORDER — FUROSEMIDE 20 MG/1
20 TABLET ORAL 2 TIMES DAILY
Status: ON HOLD | COMMUNITY
End: 2019-10-15 | Stop reason: HOSPADM

## 2019-10-05 RX ORDER — ACETAMINOPHEN 325 MG/1
650 TABLET ORAL ONCE
Status: COMPLETED | OUTPATIENT
Start: 2019-10-05 | End: 2019-10-05

## 2019-10-05 RX ORDER — ONDANSETRON 2 MG/ML
4 INJECTION INTRAMUSCULAR; INTRAVENOUS ONCE
Status: COMPLETED | OUTPATIENT
Start: 2019-10-05 | End: 2019-10-05

## 2019-10-05 RX ORDER — ATORVASTATIN CALCIUM 40 MG/1
40 TABLET, FILM COATED ORAL DAILY
Status: DISCONTINUED | OUTPATIENT
Start: 2019-10-05 | End: 2019-10-05 | Stop reason: SDUPTHER

## 2019-10-05 RX ORDER — LISINOPRIL 20 MG/1
40 TABLET ORAL 2 TIMES DAILY
Status: DISCONTINUED | OUTPATIENT
Start: 2019-10-06 | End: 2019-10-07

## 2019-10-05 RX ORDER — ALLOPURINOL 100 MG/1
100 TABLET ORAL DAILY
Status: DISCONTINUED | OUTPATIENT
Start: 2019-10-06 | End: 2019-10-15 | Stop reason: HOSPADM

## 2019-10-05 RX ORDER — ATORVASTATIN CALCIUM 40 MG/1
40 TABLET, FILM COATED ORAL NIGHTLY
Status: DISCONTINUED | OUTPATIENT
Start: 2019-10-05 | End: 2019-10-05

## 2019-10-05 RX ORDER — 0.9 % SODIUM CHLORIDE 0.9 %
2052 INTRAVENOUS SOLUTION INTRAVENOUS ONCE
Status: COMPLETED | OUTPATIENT
Start: 2019-10-05 | End: 2019-10-05

## 2019-10-05 RX ORDER — PANTOPRAZOLE SODIUM 40 MG/1
40 TABLET, DELAYED RELEASE ORAL
Status: DISCONTINUED | OUTPATIENT
Start: 2019-10-06 | End: 2019-10-06

## 2019-10-05 RX ORDER — FERROUS SULFATE 325(65) MG
325 TABLET ORAL DAILY
Status: DISCONTINUED | OUTPATIENT
Start: 2019-10-06 | End: 2019-10-05

## 2019-10-05 RX ORDER — ONDANSETRON 2 MG/ML
4 INJECTION INTRAMUSCULAR; INTRAVENOUS EVERY 5 MIN PRN
Status: DISCONTINUED | OUTPATIENT
Start: 2019-10-05 | End: 2019-10-06

## 2019-10-05 RX ORDER — ASPIRIN 81 MG/1
81 TABLET, CHEWABLE ORAL DAILY
Status: DISCONTINUED | OUTPATIENT
Start: 2019-10-05 | End: 2019-10-15 | Stop reason: HOSPADM

## 2019-10-05 RX ORDER — ASPIRIN 325 MG
325 TABLET ORAL DAILY
Status: DISCONTINUED | OUTPATIENT
Start: 2019-10-05 | End: 2019-10-07

## 2019-10-05 RX ORDER — ATORVASTATIN CALCIUM 40 MG/1
40 TABLET, FILM COATED ORAL DAILY
Status: DISCONTINUED | OUTPATIENT
Start: 2019-10-06 | End: 2019-10-15 | Stop reason: HOSPADM

## 2019-10-05 RX ORDER — HEPARIN SODIUM 1000 [USP'U]/ML
80 INJECTION, SOLUTION INTRAVENOUS; SUBCUTANEOUS ONCE
Status: COMPLETED | OUTPATIENT
Start: 2019-10-05 | End: 2019-10-05

## 2019-10-05 RX ORDER — SODIUM CHLORIDE, SODIUM LACTATE, POTASSIUM CHLORIDE, CALCIUM CHLORIDE 600; 310; 30; 20 MG/100ML; MG/100ML; MG/100ML; MG/100ML
1000 INJECTION, SOLUTION INTRAVENOUS ONCE
Status: COMPLETED | OUTPATIENT
Start: 2019-10-05 | End: 2019-10-05

## 2019-10-05 RX ORDER — HEPARIN SODIUM 1000 [USP'U]/ML
40 INJECTION, SOLUTION INTRAVENOUS; SUBCUTANEOUS PRN
Status: DISCONTINUED | OUTPATIENT
Start: 2019-10-05 | End: 2019-10-10

## 2019-10-05 RX ORDER — AMLODIPINE BESYLATE 5 MG/1
10 TABLET ORAL 2 TIMES DAILY
Status: DISCONTINUED | OUTPATIENT
Start: 2019-10-05 | End: 2019-10-15 | Stop reason: HOSPADM

## 2019-10-05 RX ORDER — LISINOPRIL 20 MG/1
40 TABLET ORAL DAILY
Status: DISCONTINUED | OUTPATIENT
Start: 2019-10-05 | End: 2019-10-05

## 2019-10-05 RX ORDER — SODIUM BICARBONATE 650 MG/1
1300 TABLET ORAL 3 TIMES DAILY
Status: DISCONTINUED | OUTPATIENT
Start: 2019-10-06 | End: 2019-10-13

## 2019-10-05 RX ORDER — ASPIRIN 81 MG/1
324 TABLET, CHEWABLE ORAL ONCE
Status: COMPLETED | OUTPATIENT
Start: 2019-10-05 | End: 2019-10-05

## 2019-10-05 RX ADMIN — HEPARIN SODIUM 5470 UNITS: 1000 INJECTION, SOLUTION INTRAVENOUS; SUBCUTANEOUS at 18:00

## 2019-10-05 RX ADMIN — ACETAMINOPHEN 650 MG: 325 TABLET, FILM COATED ORAL at 16:05

## 2019-10-05 RX ADMIN — Medication 10 ML: at 21:42

## 2019-10-05 RX ADMIN — ASPIRIN 81 MG 324 MG: 81 TABLET ORAL at 17:59

## 2019-10-05 RX ADMIN — ONDANSETRON 4 MG: 2 INJECTION INTRAMUSCULAR; INTRAVENOUS at 16:05

## 2019-10-05 RX ADMIN — HEPARIN SODIUM 18 UNITS/KG/HR: 10000 INJECTION, SOLUTION INTRAVENOUS at 18:00

## 2019-10-05 RX ADMIN — SODIUM CHLORIDE, POTASSIUM CHLORIDE, SODIUM LACTATE AND CALCIUM CHLORIDE 1000 ML: 600; 310; 30; 20 INJECTION, SOLUTION INTRAVENOUS at 18:31

## 2019-10-05 RX ADMIN — SODIUM CHLORIDE 1000 ML: 9 INJECTION, SOLUTION INTRAVENOUS at 16:08

## 2019-10-05 RX ADMIN — Medication 10 ML: at 23:19

## 2019-10-05 RX ADMIN — ONDANSETRON 4 MG: 2 INJECTION INTRAMUSCULAR; INTRAVENOUS at 23:19

## 2019-10-05 ASSESSMENT — ENCOUNTER SYMPTOMS
COUGH: 1
NAUSEA: 1
DIARRHEA: 1
WHEEZING: 0
SHORTNESS OF BREATH: 0
RHINORRHEA: 0
VOMITING: 1
ABDOMINAL PAIN: 1

## 2019-10-05 ASSESSMENT — PAIN SCALES - GENERAL
PAINLEVEL_OUTOF10: 7
PAINLEVEL_OUTOF10: 10
PAINLEVEL_OUTOF10: 0

## 2019-10-05 ASSESSMENT — PAIN DESCRIPTION - LOCATION: LOCATION: ABDOMEN

## 2019-10-06 PROBLEM — R77.8 ELEVATED TROPONIN: Status: ACTIVE | Noted: 2019-10-06

## 2019-10-06 PROBLEM — N17.9 ACUTE RENAL FAILURE SUPERIMPOSED ON STAGE 4 CHRONIC KIDNEY DISEASE (HCC): Status: ACTIVE | Noted: 2019-10-06

## 2019-10-06 PROBLEM — N18.4 ACUTE RENAL FAILURE SUPERIMPOSED ON STAGE 4 CHRONIC KIDNEY DISEASE (HCC): Status: ACTIVE | Noted: 2019-10-06

## 2019-10-06 PROBLEM — R79.89 ELEVATED TROPONIN: Status: ACTIVE | Noted: 2019-10-06

## 2019-10-06 LAB
A/G RATIO: 1.2 (ref 1.1–2.2)
ALBUMIN SERPL-MCNC: 3.4 G/DL (ref 3.4–5)
ALP BLD-CCNC: 86 U/L (ref 40–129)
ALT SERPL-CCNC: 9 U/L (ref 10–40)
AMPHETAMINE SCREEN, URINE: NORMAL
ANION GAP SERPL CALCULATED.3IONS-SCNC: 14 MMOL/L (ref 3–16)
APTT: 114 SEC (ref 26–36)
APTT: 175.9 SEC (ref 26–36)
APTT: 46 SEC (ref 26–36)
APTT: 57.1 SEC (ref 26–36)
AST SERPL-CCNC: 23 U/L (ref 15–37)
BACTERIA: ABNORMAL /HPF
BARBITURATE SCREEN URINE: NORMAL
BENZODIAZEPINE SCREEN, URINE: NORMAL
BILIRUB SERPL-MCNC: <0.2 MG/DL (ref 0–1)
BILIRUBIN URINE: ABNORMAL
BLOOD, URINE: ABNORMAL
BUN BLDV-MCNC: 47 MG/DL (ref 7–20)
C DIFF TOXIN/ANTIGEN: NORMAL
CALCIUM SERPL-MCNC: 7.7 MG/DL (ref 8.3–10.6)
CANNABINOID SCREEN URINE: NORMAL
CHLORIDE BLD-SCNC: 107 MMOL/L (ref 99–110)
CHOLESTEROL, TOTAL: 129 MG/DL (ref 0–199)
CLARITY: ABNORMAL
CO2: 20 MMOL/L (ref 21–32)
COCAINE METABOLITE SCREEN URINE: NORMAL
COLOR: YELLOW
CREAT SERPL-MCNC: 3.8 MG/DL (ref 0.6–1.2)
EPITHELIAL CELLS, UA: 7 /HPF (ref 0–5)
GFR AFRICAN AMERICAN: 14
GFR NON-AFRICAN AMERICAN: 12
GLOBULIN: 2.8 G/DL
GLUCOSE BLD-MCNC: 104 MG/DL (ref 70–99)
GLUCOSE URINE: NEGATIVE MG/DL
HCT VFR BLD CALC: 23.6 % (ref 36–48)
HCT VFR BLD CALC: 24.3 % (ref 36–48)
HCT VFR BLD CALC: 25.5 % (ref 36–48)
HCT VFR BLD CALC: 26.9 % (ref 36–48)
HDLC SERPL-MCNC: 42 MG/DL (ref 40–60)
HEMOGLOBIN: 7.5 G/DL (ref 12–16)
HEMOGLOBIN: 8 G/DL (ref 12–16)
HEMOGLOBIN: 8.4 G/DL (ref 12–16)
HEMOGLOBIN: 8.4 G/DL (ref 12–16)
HYALINE CASTS: 6 /LPF (ref 0–8)
KETONES, URINE: NEGATIVE MG/DL
LACTIC ACID: 0.8 MMOL/L (ref 0.4–2)
LDL CHOLESTEROL CALCULATED: 68 MG/DL
LEUKOCYTE ESTERASE, URINE: NEGATIVE
Lab: NORMAL
MCH RBC QN AUTO: 29.1 PG (ref 26–34)
MCHC RBC AUTO-ENTMCNC: 32.8 G/DL (ref 31–36)
MCV RBC AUTO: 88.5 FL (ref 80–100)
METHADONE SCREEN, URINE: NORMAL
MICROSCOPIC EXAMINATION: YES
NITRITE, URINE: NEGATIVE
OCCULT BLOOD DIAGNOSTIC: ABNORMAL
OCCULT BLOOD, OTHER: ABNORMAL
OPIATE SCREEN URINE: NORMAL
OXYCODONE URINE: NORMAL
PDW BLD-RTO: 16 % (ref 12.4–15.4)
PH UA: 5
PH UA: 5 (ref 5–8)
PH, GASTRIC: ABNORMAL
PHENCYCLIDINE SCREEN URINE: NORMAL
PLATELET # BLD: 198 K/UL (ref 135–450)
PMV BLD AUTO: 9.4 FL (ref 5–10.5)
POTASSIUM REFLEX MAGNESIUM: 3.6 MMOL/L (ref 3.5–5.1)
PROPOXYPHENE SCREEN: NORMAL
PROTEIN UA: 100 MG/DL
RBC # BLD: 2.88 M/UL (ref 4–5.2)
RBC UA: ABNORMAL /HPF (ref 0–2)
SODIUM BLD-SCNC: 141 MMOL/L (ref 136–145)
SPECIFIC GRAVITY UA: 1.02 (ref 1–1.03)
TOTAL PROTEIN: 6.2 G/DL (ref 6.4–8.2)
TRIGL SERPL-MCNC: 96 MG/DL (ref 0–150)
TROPONIN: 0.59 NG/ML
URINE REFLEX TO CULTURE: ABNORMAL
URINE TYPE: ABNORMAL
UROBILINOGEN, URINE: 0.2 E.U./DL
VLDLC SERPL CALC-MCNC: 19 MG/DL
WBC # BLD: 7.3 K/UL (ref 4–11)
WBC UA: 3 /HPF (ref 0–5)

## 2019-10-06 PROCEDURE — 85027 COMPLETE CBC AUTOMATED: CPT

## 2019-10-06 PROCEDURE — G0328 FECAL BLOOD SCRN IMMUNOASSAY: HCPCS

## 2019-10-06 PROCEDURE — 6370000000 HC RX 637 (ALT 250 FOR IP): Performed by: INTERNAL MEDICINE

## 2019-10-06 PROCEDURE — 6360000002 HC RX W HCPCS: Performed by: INTERNAL MEDICINE

## 2019-10-06 PROCEDURE — 85730 THROMBOPLASTIN TIME PARTIAL: CPT

## 2019-10-06 PROCEDURE — 80061 LIPID PANEL: CPT

## 2019-10-06 PROCEDURE — 83036 HEMOGLOBIN GLYCOSYLATED A1C: CPT

## 2019-10-06 PROCEDURE — 6360000002 HC RX W HCPCS: Performed by: HOSPITALIST

## 2019-10-06 PROCEDURE — 85018 HEMOGLOBIN: CPT

## 2019-10-06 PROCEDURE — C9113 INJ PANTOPRAZOLE SODIUM, VIA: HCPCS | Performed by: INTERNAL MEDICINE

## 2019-10-06 PROCEDURE — 81001 URINALYSIS AUTO W/SCOPE: CPT

## 2019-10-06 PROCEDURE — 83605 ASSAY OF LACTIC ACID: CPT

## 2019-10-06 PROCEDURE — 36415 COLL VENOUS BLD VENIPUNCTURE: CPT

## 2019-10-06 PROCEDURE — 84484 ASSAY OF TROPONIN QUANT: CPT

## 2019-10-06 PROCEDURE — 85014 HEMATOCRIT: CPT

## 2019-10-06 PROCEDURE — 80053 COMPREHEN METABOLIC PANEL: CPT

## 2019-10-06 PROCEDURE — 87449 NOS EACH ORGANISM AG IA: CPT

## 2019-10-06 PROCEDURE — 99223 1ST HOSP IP/OBS HIGH 75: CPT | Performed by: INTERNAL MEDICINE

## 2019-10-06 PROCEDURE — 1200000000 HC SEMI PRIVATE

## 2019-10-06 PROCEDURE — 93005 ELECTROCARDIOGRAM TRACING: CPT | Performed by: INTERNAL MEDICINE

## 2019-10-06 PROCEDURE — 82271 OCCULT BLOOD OTHER SOURCES: CPT

## 2019-10-06 PROCEDURE — 2580000003 HC RX 258: Performed by: INTERNAL MEDICINE

## 2019-10-06 PROCEDURE — 87324 CLOSTRIDIUM AG IA: CPT

## 2019-10-06 PROCEDURE — 80307 DRUG TEST PRSMV CHEM ANLYZR: CPT

## 2019-10-06 PROCEDURE — 6360000002 HC RX W HCPCS

## 2019-10-06 PROCEDURE — 94760 N-INVAS EAR/PLS OXIMETRY 1: CPT

## 2019-10-06 RX ORDER — PROCHLORPERAZINE EDISYLATE 5 MG/ML
10 INJECTION INTRAMUSCULAR; INTRAVENOUS EVERY 6 HOURS PRN
Status: DISCONTINUED | OUTPATIENT
Start: 2019-10-06 | End: 2019-10-15 | Stop reason: HOSPADM

## 2019-10-06 RX ORDER — PANTOPRAZOLE SODIUM 40 MG/10ML
40 INJECTION, POWDER, LYOPHILIZED, FOR SOLUTION INTRAVENOUS 2 TIMES DAILY
Status: DISCONTINUED | OUTPATIENT
Start: 2019-10-06 | End: 2019-10-15 | Stop reason: HOSPADM

## 2019-10-06 RX ORDER — ONDANSETRON 2 MG/ML
INJECTION INTRAMUSCULAR; INTRAVENOUS
Status: COMPLETED
Start: 2019-10-06 | End: 2019-10-06

## 2019-10-06 RX ORDER — PROMETHAZINE HYDROCHLORIDE 25 MG/ML
6.25 INJECTION, SOLUTION INTRAMUSCULAR; INTRAVENOUS EVERY 6 HOURS PRN
Status: DISCONTINUED | OUTPATIENT
Start: 2019-10-06 | End: 2019-10-15 | Stop reason: HOSPADM

## 2019-10-06 RX ADMIN — PANTOPRAZOLE SODIUM 40 MG: 40 INJECTION, POWDER, FOR SOLUTION INTRAVENOUS at 02:48

## 2019-10-06 RX ADMIN — PANTOPRAZOLE SODIUM 40 MG: 40 INJECTION, POWDER, FOR SOLUTION INTRAVENOUS at 21:03

## 2019-10-06 RX ADMIN — LISINOPRIL 40 MG: 20 TABLET ORAL at 09:16

## 2019-10-06 RX ADMIN — AMLODIPINE BESYLATE 10 MG: 5 TABLET ORAL at 21:04

## 2019-10-06 RX ADMIN — AMLODIPINE BESYLATE 10 MG: 5 TABLET ORAL at 09:16

## 2019-10-06 RX ADMIN — Medication 10 ML: at 09:17

## 2019-10-06 RX ADMIN — FERROUS SULFATE TAB 325 MG (65 MG ELEMENTAL FE) 325 MG: 325 (65 FE) TAB at 09:16

## 2019-10-06 RX ADMIN — HEPARIN SODIUM 6 UNITS/KG/HR: 10000 INJECTION, SOLUTION INTRAVENOUS at 11:16

## 2019-10-06 RX ADMIN — DESMOPRESSIN ACETATE 40 MG: 0.2 TABLET ORAL at 09:16

## 2019-10-06 RX ADMIN — FERROUS SULFATE TAB 325 MG (65 MG ELEMENTAL FE) 325 MG: 325 (65 FE) TAB at 16:49

## 2019-10-06 RX ADMIN — PANTOPRAZOLE SODIUM 40 MG: 40 INJECTION, POWDER, FOR SOLUTION INTRAVENOUS at 09:15

## 2019-10-06 RX ADMIN — ALLOPURINOL 100 MG: 100 TABLET ORAL at 09:15

## 2019-10-06 RX ADMIN — Medication 10 ML: at 21:04

## 2019-10-06 RX ADMIN — FERROUS SULFATE TAB 325 MG (65 MG ELEMENTAL FE) 325 MG: 325 (65 FE) TAB at 12:34

## 2019-10-06 RX ADMIN — LISINOPRIL 40 MG: 20 TABLET ORAL at 21:05

## 2019-10-06 RX ADMIN — SODIUM BICARBONATE 650 MG TABLET 1300 MG: at 14:46

## 2019-10-06 RX ADMIN — ONDANSETRON 4 MG: 2 INJECTION INTRAMUSCULAR; INTRAVENOUS at 12:34

## 2019-10-06 RX ADMIN — VITAMIN D, TAB 1000IU (100/BT) 2000 UNITS: 25 TAB at 09:15

## 2019-10-06 RX ADMIN — SODIUM BICARBONATE 650 MG TABLET 1300 MG: at 21:04

## 2019-10-06 RX ADMIN — SODIUM BICARBONATE 650 MG TABLET 1300 MG: at 09:15

## 2019-10-06 RX ADMIN — PROMETHAZINE HYDROCHLORIDE 6.25 MG: 25 INJECTION INTRAMUSCULAR; INTRAVENOUS at 14:46

## 2019-10-06 RX ADMIN — ASPIRIN 81 MG 81 MG: 81 TABLET ORAL at 09:16

## 2019-10-06 ASSESSMENT — PAIN SCALES - GENERAL
PAINLEVEL_OUTOF10: 0

## 2019-10-07 ENCOUNTER — ANESTHESIA (OUTPATIENT)
Dept: ENDOSCOPY | Age: 74
DRG: 280 | End: 2019-10-07
Payer: MEDICARE

## 2019-10-07 ENCOUNTER — ANESTHESIA EVENT (OUTPATIENT)
Dept: ENDOSCOPY | Age: 74
DRG: 280 | End: 2019-10-07
Payer: MEDICARE

## 2019-10-07 VITALS
SYSTOLIC BLOOD PRESSURE: 98 MMHG | OXYGEN SATURATION: 98 % | DIASTOLIC BLOOD PRESSURE: 51 MMHG | RESPIRATION RATE: 23 BRPM

## 2019-10-07 LAB
ANION GAP SERPL CALCULATED.3IONS-SCNC: 17 MMOL/L (ref 3–16)
APTT: 51.6 SEC (ref 26–36)
APTT: 76.6 SEC (ref 26–36)
APTT: 96.7 SEC (ref 26–36)
BUN BLDV-MCNC: 52 MG/DL (ref 7–20)
CALCIUM SERPL-MCNC: 7.6 MG/DL (ref 8.3–10.6)
CHLORIDE BLD-SCNC: 102 MMOL/L (ref 99–110)
CO2: 20 MMOL/L (ref 21–32)
CREAT SERPL-MCNC: 4.2 MG/DL (ref 0.6–1.2)
EKG ATRIAL RATE: 91 BPM
EKG ATRIAL RATE: 94 BPM
EKG DIAGNOSIS: NORMAL
EKG DIAGNOSIS: NORMAL
EKG P AXIS: 56 DEGREES
EKG P AXIS: 73 DEGREES
EKG P-R INTERVAL: 148 MS
EKG P-R INTERVAL: 156 MS
EKG Q-T INTERVAL: 452 MS
EKG Q-T INTERVAL: 454 MS
EKG QRS DURATION: 76 MS
EKG QRS DURATION: 80 MS
EKG QTC CALCULATION (BAZETT): 558 MS
EKG QTC CALCULATION (BAZETT): 565 MS
EKG R AXIS: 45 DEGREES
EKG R AXIS: 49 DEGREES
EKG T AXIS: 30 DEGREES
EKG T AXIS: 84 DEGREES
EKG VENTRICULAR RATE: 91 BPM
EKG VENTRICULAR RATE: 94 BPM
ESTIMATED AVERAGE GLUCOSE: 108.3 MG/DL
GFR AFRICAN AMERICAN: 12
GFR NON-AFRICAN AMERICAN: 10
GLUCOSE BLD-MCNC: 94 MG/DL (ref 70–99)
HBA1C MFR BLD: 5.4 %
HCT VFR BLD CALC: 23.4 % (ref 36–48)
HCT VFR BLD CALC: 27.5 % (ref 36–48)
HEMOGLOBIN: 7.5 G/DL (ref 12–16)
HEMOGLOBIN: 8.3 G/DL (ref 12–16)
LEFT VENTRICULAR EJECTION FRACTION HIGH VALUE: 60 %
LEFT VENTRICULAR EJECTION FRACTION MODE: NORMAL
LV EF: 60 %
LVEF MODALITY: NORMAL
MAGNESIUM: 1.4 MG/DL (ref 1.8–2.4)
MCH RBC QN AUTO: 28.5 PG (ref 26–34)
MCHC RBC AUTO-ENTMCNC: 32.2 G/DL (ref 31–36)
MCV RBC AUTO: 88.4 FL (ref 80–100)
PDW BLD-RTO: 15.6 % (ref 12.4–15.4)
PLATELET # BLD: 189 K/UL (ref 135–450)
PMV BLD AUTO: 10.3 FL (ref 5–10.5)
POTASSIUM REFLEX MAGNESIUM: 3.5 MMOL/L (ref 3.5–5.1)
RBC # BLD: 2.64 M/UL (ref 4–5.2)
SODIUM BLD-SCNC: 139 MMOL/L (ref 136–145)
WBC # BLD: 8.2 K/UL (ref 4–11)

## 2019-10-07 PROCEDURE — 85027 COMPLETE CBC AUTOMATED: CPT

## 2019-10-07 PROCEDURE — 2580000003 HC RX 258: Performed by: INTERNAL MEDICINE

## 2019-10-07 PROCEDURE — 85730 THROMBOPLASTIN TIME PARTIAL: CPT

## 2019-10-07 PROCEDURE — 90686 IIV4 VACC NO PRSV 0.5 ML IM: CPT | Performed by: INTERNAL MEDICINE

## 2019-10-07 PROCEDURE — 6370000000 HC RX 637 (ALT 250 FOR IP): Performed by: INTERNAL MEDICINE

## 2019-10-07 PROCEDURE — 6360000002 HC RX W HCPCS: Performed by: NURSE ANESTHETIST, CERTIFIED REGISTERED

## 2019-10-07 PROCEDURE — 99222 1ST HOSP IP/OBS MODERATE 55: CPT | Performed by: INTERNAL MEDICINE

## 2019-10-07 PROCEDURE — 2720000010 HC SURG SUPPLY STERILE: Performed by: INTERNAL MEDICINE

## 2019-10-07 PROCEDURE — 36415 COLL VENOUS BLD VENIPUNCTURE: CPT

## 2019-10-07 PROCEDURE — 7100000001 HC PACU RECOVERY - ADDTL 15 MIN: Performed by: INTERNAL MEDICINE

## 2019-10-07 PROCEDURE — 93005 ELECTROCARDIOGRAM TRACING: CPT | Performed by: ANESTHESIOLOGY

## 2019-10-07 PROCEDURE — 84300 ASSAY OF URINE SODIUM: CPT

## 2019-10-07 PROCEDURE — C9113 INJ PANTOPRAZOLE SODIUM, VIA: HCPCS | Performed by: INTERNAL MEDICINE

## 2019-10-07 PROCEDURE — 3609017100 HC EGD: Performed by: INTERNAL MEDICINE

## 2019-10-07 PROCEDURE — 2500000003 HC RX 250 WO HCPCS: Performed by: NURSE ANESTHETIST, CERTIFIED REGISTERED

## 2019-10-07 PROCEDURE — 6360000002 HC RX W HCPCS: Performed by: INTERNAL MEDICINE

## 2019-10-07 PROCEDURE — 3700000001 HC ADD 15 MINUTES (ANESTHESIA): Performed by: INTERNAL MEDICINE

## 2019-10-07 PROCEDURE — 2709999900 HC NON-CHARGEABLE SUPPLY: Performed by: INTERNAL MEDICINE

## 2019-10-07 PROCEDURE — 93306 TTE W/DOPPLER COMPLETE: CPT

## 2019-10-07 PROCEDURE — G0008 ADMIN INFLUENZA VIRUS VAC: HCPCS | Performed by: INTERNAL MEDICINE

## 2019-10-07 PROCEDURE — 94760 N-INVAS EAR/PLS OXIMETRY 1: CPT

## 2019-10-07 PROCEDURE — 84156 ASSAY OF PROTEIN URINE: CPT

## 2019-10-07 PROCEDURE — 80048 BASIC METABOLIC PNL TOTAL CA: CPT

## 2019-10-07 PROCEDURE — 3700000000 HC ANESTHESIA ATTENDED CARE: Performed by: INTERNAL MEDICINE

## 2019-10-07 PROCEDURE — 82570 ASSAY OF URINE CREATININE: CPT

## 2019-10-07 PROCEDURE — 0D568ZZ DESTRUCTION OF STOMACH, VIA NATURAL OR ARTIFICIAL OPENING ENDOSCOPIC: ICD-10-PCS | Performed by: INTERNAL MEDICINE

## 2019-10-07 PROCEDURE — 85014 HEMATOCRIT: CPT

## 2019-10-07 PROCEDURE — 83735 ASSAY OF MAGNESIUM: CPT

## 2019-10-07 PROCEDURE — 1200000000 HC SEMI PRIVATE

## 2019-10-07 PROCEDURE — 85018 HEMOGLOBIN: CPT

## 2019-10-07 PROCEDURE — 2580000003 HC RX 258: Performed by: NURSE ANESTHETIST, CERTIFIED REGISTERED

## 2019-10-07 PROCEDURE — 7100000000 HC PACU RECOVERY - FIRST 15 MIN: Performed by: INTERNAL MEDICINE

## 2019-10-07 RX ORDER — PROPOFOL 10 MG/ML
INJECTION, EMULSION INTRAVENOUS PRN
Status: DISCONTINUED | OUTPATIENT
Start: 2019-10-07 | End: 2019-10-07

## 2019-10-07 RX ORDER — SODIUM CHLORIDE 9 MG/ML
INJECTION, SOLUTION INTRAVENOUS CONTINUOUS
Status: DISCONTINUED | OUTPATIENT
Start: 2019-10-07 | End: 2019-10-10

## 2019-10-07 RX ORDER — MAGNESIUM SULFATE 1 G/100ML
1 INJECTION INTRAVENOUS PRN
Status: DISCONTINUED | OUTPATIENT
Start: 2019-10-07 | End: 2019-10-15 | Stop reason: HOSPADM

## 2019-10-07 RX ORDER — LIDOCAINE HYDROCHLORIDE 20 MG/ML
INJECTION, SOLUTION EPIDURAL; INFILTRATION; INTRACAUDAL; PERINEURAL PRN
Status: DISCONTINUED | OUTPATIENT
Start: 2019-10-07 | End: 2019-10-07 | Stop reason: SDUPTHER

## 2019-10-07 RX ORDER — MAGNESIUM SULFATE IN WATER 40 MG/ML
2 INJECTION, SOLUTION INTRAVENOUS ONCE
Status: COMPLETED | OUTPATIENT
Start: 2019-10-07 | End: 2019-10-07

## 2019-10-07 RX ORDER — PROPOFOL 10 MG/ML
INJECTION, EMULSION INTRAVENOUS PRN
Status: DISCONTINUED | OUTPATIENT
Start: 2019-10-07 | End: 2019-10-07 | Stop reason: SDUPTHER

## 2019-10-07 RX ORDER — SODIUM CHLORIDE 9 MG/ML
INJECTION, SOLUTION INTRAVENOUS CONTINUOUS PRN
Status: DISCONTINUED | OUTPATIENT
Start: 2019-10-07 | End: 2019-10-07 | Stop reason: SDUPTHER

## 2019-10-07 RX ADMIN — ASPIRIN 81 MG 81 MG: 81 TABLET ORAL at 11:20

## 2019-10-07 RX ADMIN — SODIUM BICARBONATE 650 MG TABLET 1300 MG: at 10:31

## 2019-10-07 RX ADMIN — DESMOPRESSIN ACETATE 40 MG: 0.2 TABLET ORAL at 10:34

## 2019-10-07 RX ADMIN — SODIUM BICARBONATE 650 MG TABLET 1300 MG: at 15:39

## 2019-10-07 RX ADMIN — Medication 10 ML: at 10:34

## 2019-10-07 RX ADMIN — PROPOFOL 60 MG: 10 INJECTION, EMULSION INTRAVENOUS at 09:14

## 2019-10-07 RX ADMIN — INFLUENZA A VIRUS A/BRISBANE/02/2018 IVR-190 (H1N1) ANTIGEN (PROPIOLACTONE INACTIVATED), INFLUENZA A VIRUS A/KANSAS/14/2017 X-327 (H3N2) ANTIGEN (PROPIOLACTONE INACTIVATED), INFLUENZA B VIRUS B/MARYLAND/15/2016 ANTIGEN (PROPIOLACTONE INACTIVATED), INFLUENZA B VIRUS B/PHUKET/3073/2013 BVR-1B ANTIGEN (PROPIOLACTONE INACTIVATED) 0.5 ML: 15; 15; 15; 15 INJECTION, SUSPENSION INTRAMUSCULAR at 23:54

## 2019-10-07 RX ADMIN — FERROUS SULFATE TAB 325 MG (65 MG ELEMENTAL FE) 325 MG: 325 (65 FE) TAB at 10:31

## 2019-10-07 RX ADMIN — LIDOCAINE HYDROCHLORIDE 100 MG: 20 INJECTION, SOLUTION EPIDURAL; INFILTRATION; INTRACAUDAL; PERINEURAL at 09:10

## 2019-10-07 RX ADMIN — SODIUM CHLORIDE: 9 INJECTION, SOLUTION INTRAVENOUS at 09:04

## 2019-10-07 RX ADMIN — AMLODIPINE BESYLATE 10 MG: 5 TABLET ORAL at 21:21

## 2019-10-07 RX ADMIN — VITAMIN D, TAB 1000IU (100/BT) 2000 UNITS: 25 TAB at 10:30

## 2019-10-07 RX ADMIN — FERROUS SULFATE TAB 325 MG (65 MG ELEMENTAL FE) 325 MG: 325 (65 FE) TAB at 18:23

## 2019-10-07 RX ADMIN — HEPARIN SODIUM 8 UNITS/KG/HR: 10000 INJECTION, SOLUTION INTRAVENOUS at 01:19

## 2019-10-07 RX ADMIN — Medication 10 ML: at 21:21

## 2019-10-07 RX ADMIN — MAGNESIUM SULFATE HEPTAHYDRATE 2 G: 40 INJECTION, SOLUTION INTRAVENOUS at 11:33

## 2019-10-07 RX ADMIN — SODIUM CHLORIDE: 9 INJECTION, SOLUTION INTRAVENOUS at 10:55

## 2019-10-07 RX ADMIN — ALLOPURINOL 100 MG: 100 TABLET ORAL at 10:31

## 2019-10-07 RX ADMIN — FERROUS SULFATE TAB 325 MG (65 MG ELEMENTAL FE) 325 MG: 325 (65 FE) TAB at 11:33

## 2019-10-07 RX ADMIN — SODIUM BICARBONATE 650 MG TABLET 1300 MG: at 21:21

## 2019-10-07 RX ADMIN — PANTOPRAZOLE SODIUM 40 MG: 40 INJECTION, POWDER, FOR SOLUTION INTRAVENOUS at 21:21

## 2019-10-07 RX ADMIN — HEPARIN SODIUM 2740 UNITS: 1000 INJECTION, SOLUTION INTRAVENOUS; SUBCUTANEOUS at 01:17

## 2019-10-07 RX ADMIN — PROPOFOL 60 MG: 10 INJECTION, EMULSION INTRAVENOUS at 09:10

## 2019-10-07 RX ADMIN — AMLODIPINE BESYLATE 10 MG: 5 TABLET ORAL at 10:30

## 2019-10-07 RX ADMIN — PANTOPRAZOLE SODIUM 40 MG: 40 INJECTION, POWDER, FOR SOLUTION INTRAVENOUS at 10:34

## 2019-10-07 RX ADMIN — HEPARIN SODIUM 2740 UNITS: 1000 INJECTION, SOLUTION INTRAVENOUS; SUBCUTANEOUS at 15:46

## 2019-10-07 ASSESSMENT — PAIN SCALES - GENERAL
PAINLEVEL_OUTOF10: 0

## 2019-10-08 LAB
ABO/RH: NORMAL
ALBUMIN SERPL-MCNC: 3 G/DL (ref 3.4–5)
ANION GAP SERPL CALCULATED.3IONS-SCNC: 11 MMOL/L (ref 3–16)
ANTIBODY SCREEN: NORMAL
APTT: 53.1 SEC (ref 26–36)
APTT: 88.2 SEC (ref 26–36)
BLOOD BANK DISPENSE STATUS: NORMAL
BLOOD BANK DISPENSE STATUS: NORMAL
BLOOD BANK PRODUCT CODE: NORMAL
BLOOD BANK PRODUCT CODE: NORMAL
BPU ID: NORMAL
BPU ID: NORMAL
BUN BLDV-MCNC: 48 MG/DL (ref 7–20)
CALCIUM SERPL-MCNC: 7.7 MG/DL (ref 8.3–10.6)
CHLORIDE BLD-SCNC: 109 MMOL/L (ref 99–110)
CO2: 21 MMOL/L (ref 21–32)
CREAT SERPL-MCNC: 3.7 MG/DL (ref 0.6–1.2)
CREATININE URINE: 102.9 MG/DL (ref 28–259)
DESCRIPTION BLOOD BANK: NORMAL
DESCRIPTION BLOOD BANK: NORMAL
GFR AFRICAN AMERICAN: 14
GFR NON-AFRICAN AMERICAN: 12
GLUCOSE BLD-MCNC: 92 MG/DL (ref 70–99)
HCT VFR BLD CALC: 21.4 % (ref 36–48)
HEMOGLOBIN: 6.8 G/DL (ref 12–16)
MAGNESIUM: 1.8 MG/DL (ref 1.8–2.4)
PHOSPHORUS: 4.3 MG/DL (ref 2.5–4.9)
POTASSIUM SERPL-SCNC: 3.5 MMOL/L (ref 3.5–5.1)
PROTEIN PROTEIN: 26 MG/DL
SODIUM BLD-SCNC: 141 MMOL/L (ref 136–145)
SODIUM URINE: 32 MMOL/L

## 2019-10-08 PROCEDURE — 99232 SBSQ HOSP IP/OBS MODERATE 35: CPT | Performed by: INTERNAL MEDICINE

## 2019-10-08 PROCEDURE — 1200000000 HC SEMI PRIVATE

## 2019-10-08 PROCEDURE — 86900 BLOOD TYPING SEROLOGIC ABO: CPT

## 2019-10-08 PROCEDURE — P9016 RBC LEUKOCYTES REDUCED: HCPCS

## 2019-10-08 PROCEDURE — 80069 RENAL FUNCTION PANEL: CPT

## 2019-10-08 PROCEDURE — 6370000000 HC RX 637 (ALT 250 FOR IP): Performed by: INTERNAL MEDICINE

## 2019-10-08 PROCEDURE — 6360000002 HC RX W HCPCS: Performed by: INTERNAL MEDICINE

## 2019-10-08 PROCEDURE — 86901 BLOOD TYPING SEROLOGIC RH(D): CPT

## 2019-10-08 PROCEDURE — 36415 COLL VENOUS BLD VENIPUNCTURE: CPT

## 2019-10-08 PROCEDURE — 83735 ASSAY OF MAGNESIUM: CPT

## 2019-10-08 PROCEDURE — 86850 RBC ANTIBODY SCREEN: CPT

## 2019-10-08 PROCEDURE — 85730 THROMBOPLASTIN TIME PARTIAL: CPT

## 2019-10-08 PROCEDURE — 85018 HEMOGLOBIN: CPT

## 2019-10-08 PROCEDURE — 36430 TRANSFUSION BLD/BLD COMPNT: CPT

## 2019-10-08 PROCEDURE — C9113 INJ PANTOPRAZOLE SODIUM, VIA: HCPCS | Performed by: INTERNAL MEDICINE

## 2019-10-08 PROCEDURE — 86923 COMPATIBILITY TEST ELECTRIC: CPT

## 2019-10-08 PROCEDURE — 2580000003 HC RX 258: Performed by: INTERNAL MEDICINE

## 2019-10-08 PROCEDURE — 85014 HEMATOCRIT: CPT

## 2019-10-08 RX ORDER — 0.9 % SODIUM CHLORIDE 0.9 %
250 INTRAVENOUS SOLUTION INTRAVENOUS ONCE
Status: COMPLETED | OUTPATIENT
Start: 2019-10-08 | End: 2019-10-08

## 2019-10-08 RX ADMIN — ASPIRIN 81 MG 81 MG: 81 TABLET ORAL at 08:49

## 2019-10-08 RX ADMIN — DESMOPRESSIN ACETATE 40 MG: 0.2 TABLET ORAL at 08:50

## 2019-10-08 RX ADMIN — HEPARIN SODIUM 9.06 UNITS/KG/HR: 10000 INJECTION, SOLUTION INTRAVENOUS at 22:19

## 2019-10-08 RX ADMIN — SODIUM BICARBONATE 650 MG TABLET 1300 MG: at 08:49

## 2019-10-08 RX ADMIN — VITAMIN D, TAB 1000IU (100/BT) 2000 UNITS: 25 TAB at 08:50

## 2019-10-08 RX ADMIN — FERROUS SULFATE TAB 325 MG (65 MG ELEMENTAL FE) 325 MG: 325 (65 FE) TAB at 20:55

## 2019-10-08 RX ADMIN — PANTOPRAZOLE SODIUM 40 MG: 40 INJECTION, POWDER, FOR SOLUTION INTRAVENOUS at 08:50

## 2019-10-08 RX ADMIN — HEPARIN SODIUM 2740 UNITS: 1000 INJECTION, SOLUTION INTRAVENOUS; SUBCUTANEOUS at 04:18

## 2019-10-08 RX ADMIN — SODIUM CHLORIDE 250 ML: 9 INJECTION, SOLUTION INTRAVENOUS at 10:37

## 2019-10-08 RX ADMIN — AMLODIPINE BESYLATE 10 MG: 5 TABLET ORAL at 20:55

## 2019-10-08 RX ADMIN — AMLODIPINE BESYLATE 10 MG: 5 TABLET ORAL at 08:50

## 2019-10-08 RX ADMIN — SODIUM BICARBONATE 650 MG TABLET 1300 MG: at 20:55

## 2019-10-08 RX ADMIN — PANTOPRAZOLE SODIUM 40 MG: 40 INJECTION, POWDER, FOR SOLUTION INTRAVENOUS at 20:55

## 2019-10-08 RX ADMIN — Medication 10 ML: at 20:56

## 2019-10-08 RX ADMIN — FERROUS SULFATE TAB 325 MG (65 MG ELEMENTAL FE) 325 MG: 325 (65 FE) TAB at 08:49

## 2019-10-08 ASSESSMENT — PAIN SCALES - GENERAL
PAINLEVEL_OUTOF10: 0

## 2019-10-09 LAB
ALBUMIN SERPL-MCNC: 3.3 G/DL (ref 3.4–5)
ANION GAP SERPL CALCULATED.3IONS-SCNC: 14 MMOL/L (ref 3–16)
APTT: 50.8 SEC (ref 26–36)
APTT: 66.1 SEC (ref 26–36)
APTT: 86.8 SEC (ref 26–36)
BUN BLDV-MCNC: 37 MG/DL (ref 7–20)
CALCIUM SERPL-MCNC: 8.4 MG/DL (ref 8.3–10.6)
CHLORIDE BLD-SCNC: 108 MMOL/L (ref 99–110)
CO2: 21 MMOL/L (ref 21–32)
CREAT SERPL-MCNC: 2.7 MG/DL (ref 0.6–1.2)
GFR AFRICAN AMERICAN: 21
GFR NON-AFRICAN AMERICAN: 17
GLUCOSE BLD-MCNC: 103 MG/DL (ref 70–99)
HCT VFR BLD CALC: 23.2 % (ref 36–48)
HCT VFR BLD CALC: 26.5 % (ref 36–48)
HCT VFR BLD CALC: 27.1 % (ref 36–48)
HCT VFR BLD CALC: 29.9 % (ref 36–48)
HEMOGLOBIN: 7.5 G/DL (ref 12–16)
HEMOGLOBIN: 8.4 G/DL (ref 12–16)
HEMOGLOBIN: 8.8 G/DL (ref 12–16)
HEMOGLOBIN: 9.6 G/DL (ref 12–16)
PHOSPHORUS: 3.3 MG/DL (ref 2.5–4.9)
POTASSIUM SERPL-SCNC: 3.6 MMOL/L (ref 3.5–5.1)
SODIUM BLD-SCNC: 143 MMOL/L (ref 136–145)

## 2019-10-09 PROCEDURE — 85730 THROMBOPLASTIN TIME PARTIAL: CPT

## 2019-10-09 PROCEDURE — 6360000002 HC RX W HCPCS: Performed by: INTERNAL MEDICINE

## 2019-10-09 PROCEDURE — 36415 COLL VENOUS BLD VENIPUNCTURE: CPT

## 2019-10-09 PROCEDURE — C9113 INJ PANTOPRAZOLE SODIUM, VIA: HCPCS | Performed by: INTERNAL MEDICINE

## 2019-10-09 PROCEDURE — 99232 SBSQ HOSP IP/OBS MODERATE 35: CPT | Performed by: INTERNAL MEDICINE

## 2019-10-09 PROCEDURE — 2580000003 HC RX 258: Performed by: INTERNAL MEDICINE

## 2019-10-09 PROCEDURE — 85014 HEMATOCRIT: CPT

## 2019-10-09 PROCEDURE — 1200000000 HC SEMI PRIVATE

## 2019-10-09 PROCEDURE — 6370000000 HC RX 637 (ALT 250 FOR IP): Performed by: INTERNAL MEDICINE

## 2019-10-09 PROCEDURE — 80069 RENAL FUNCTION PANEL: CPT

## 2019-10-09 PROCEDURE — 85018 HEMOGLOBIN: CPT

## 2019-10-09 RX ADMIN — Medication 10 ML: at 09:13

## 2019-10-09 RX ADMIN — Medication 10 ML: at 21:09

## 2019-10-09 RX ADMIN — HEPARIN SODIUM 2740 UNITS: 1000 INJECTION, SOLUTION INTRAVENOUS; SUBCUTANEOUS at 01:40

## 2019-10-09 RX ADMIN — PANTOPRAZOLE SODIUM 40 MG: 40 INJECTION, POWDER, FOR SOLUTION INTRAVENOUS at 09:13

## 2019-10-09 RX ADMIN — HEPARIN SODIUM 11 UNITS/KG/HR: 10000 INJECTION, SOLUTION INTRAVENOUS at 01:40

## 2019-10-09 RX ADMIN — FERROUS SULFATE TAB 325 MG (65 MG ELEMENTAL FE) 325 MG: 325 (65 FE) TAB at 09:19

## 2019-10-09 RX ADMIN — DESMOPRESSIN ACETATE 40 MG: 0.2 TABLET ORAL at 09:13

## 2019-10-09 RX ADMIN — FERROUS SULFATE TAB 325 MG (65 MG ELEMENTAL FE) 325 MG: 325 (65 FE) TAB at 17:50

## 2019-10-09 RX ADMIN — AMLODIPINE BESYLATE 10 MG: 5 TABLET ORAL at 21:08

## 2019-10-09 RX ADMIN — PANTOPRAZOLE SODIUM 40 MG: 40 INJECTION, POWDER, FOR SOLUTION INTRAVENOUS at 21:08

## 2019-10-09 RX ADMIN — SODIUM BICARBONATE 650 MG TABLET 1300 MG: at 21:08

## 2019-10-09 RX ADMIN — SODIUM BICARBONATE 650 MG TABLET 1300 MG: at 14:01

## 2019-10-09 RX ADMIN — VITAMIN D, TAB 1000IU (100/BT) 2000 UNITS: 25 TAB at 11:42

## 2019-10-09 RX ADMIN — ASPIRIN 81 MG 81 MG: 81 TABLET ORAL at 09:13

## 2019-10-09 RX ADMIN — FERROUS SULFATE TAB 325 MG (65 MG ELEMENTAL FE) 325 MG: 325 (65 FE) TAB at 11:42

## 2019-10-09 RX ADMIN — AMLODIPINE BESYLATE 10 MG: 5 TABLET ORAL at 09:13

## 2019-10-09 RX ADMIN — SODIUM BICARBONATE 650 MG TABLET 1300 MG: at 09:13

## 2019-10-09 RX ADMIN — SODIUM CHLORIDE: 9 INJECTION, SOLUTION INTRAVENOUS at 14:01

## 2019-10-09 ASSESSMENT — PAIN SCALES - GENERAL
PAINLEVEL_OUTOF10: 0

## 2019-10-10 ENCOUNTER — APPOINTMENT (OUTPATIENT)
Dept: GENERAL RADIOLOGY | Age: 74
DRG: 280 | End: 2019-10-10
Payer: MEDICARE

## 2019-10-10 LAB
ALBUMIN SERPL-MCNC: 3.1 G/DL (ref 3.4–5)
ANION GAP SERPL CALCULATED.3IONS-SCNC: 12 MMOL/L (ref 3–16)
APTT: 46.4 SEC (ref 26–36)
APTT: 93.3 SEC (ref 26–36)
BLOOD CULTURE, ROUTINE: NORMAL
BUN BLDV-MCNC: 28 MG/DL (ref 7–20)
CALCIUM SERPL-MCNC: 7.6 MG/DL (ref 8.3–10.6)
CHLORIDE BLD-SCNC: 114 MMOL/L (ref 99–110)
CO2: 19 MMOL/L (ref 21–32)
CREAT SERPL-MCNC: 1.9 MG/DL (ref 0.6–1.2)
CULTURE, BLOOD 2: NORMAL
GFR AFRICAN AMERICAN: 31
GFR NON-AFRICAN AMERICAN: 26
GLUCOSE BLD-MCNC: 100 MG/DL (ref 70–99)
HCT VFR BLD CALC: 26 % (ref 36–48)
HCT VFR BLD CALC: 26.6 % (ref 36–48)
HCT VFR BLD CALC: 27.6 % (ref 36–48)
HEMOGLOBIN: 8.5 G/DL (ref 12–16)
HEMOGLOBIN: 8.5 G/DL (ref 12–16)
HEMOGLOBIN: 8.8 G/DL (ref 12–16)
MCH RBC QN AUTO: 29.1 PG (ref 26–34)
MCHC RBC AUTO-ENTMCNC: 32.7 G/DL (ref 31–36)
MCV RBC AUTO: 89.1 FL (ref 80–100)
PDW BLD-RTO: 15.6 % (ref 12.4–15.4)
PHOSPHORUS: 2.8 MG/DL (ref 2.5–4.9)
PLATELET # BLD: 192 K/UL (ref 135–450)
PMV BLD AUTO: 11 FL (ref 5–10.5)
POTASSIUM SERPL-SCNC: 2.9 MMOL/L (ref 3.5–5.1)
POTASSIUM SERPL-SCNC: 3.6 MMOL/L (ref 3.5–5.1)
RBC # BLD: 2.92 M/UL (ref 4–5.2)
SEDIMENTATION RATE, ERYTHROCYTE: 52 MM/HR (ref 0–30)
SODIUM BLD-SCNC: 145 MMOL/L (ref 136–145)
WBC # BLD: 13.8 K/UL (ref 4–11)

## 2019-10-10 PROCEDURE — 99153 MOD SED SAME PHYS/QHP EA: CPT

## 2019-10-10 PROCEDURE — 6370000000 HC RX 637 (ALT 250 FOR IP): Performed by: INTERNAL MEDICINE

## 2019-10-10 PROCEDURE — 2500000003 HC RX 250 WO HCPCS

## 2019-10-10 PROCEDURE — B2111ZZ FLUOROSCOPY OF MULTIPLE CORONARY ARTERIES USING LOW OSMOLAR CONTRAST: ICD-10-PCS | Performed by: INTERNAL MEDICINE

## 2019-10-10 PROCEDURE — 6370000000 HC RX 637 (ALT 250 FOR IP): Performed by: HOSPITALIST

## 2019-10-10 PROCEDURE — 85014 HEMATOCRIT: CPT

## 2019-10-10 PROCEDURE — 6360000002 HC RX W HCPCS: Performed by: HOSPITALIST

## 2019-10-10 PROCEDURE — 93458 L HRT ARTERY/VENTRICLE ANGIO: CPT

## 2019-10-10 PROCEDURE — 2580000003 HC RX 258: Performed by: INTERNAL MEDICINE

## 2019-10-10 PROCEDURE — 71045 X-RAY EXAM CHEST 1 VIEW: CPT

## 2019-10-10 PROCEDURE — 84132 ASSAY OF SERUM POTASSIUM: CPT

## 2019-10-10 PROCEDURE — 85018 HEMOGLOBIN: CPT

## 2019-10-10 PROCEDURE — 99152 MOD SED SAME PHYS/QHP 5/>YRS: CPT

## 2019-10-10 PROCEDURE — C1894 INTRO/SHEATH, NON-LASER: HCPCS

## 2019-10-10 PROCEDURE — 6360000002 HC RX W HCPCS: Performed by: INTERNAL MEDICINE

## 2019-10-10 PROCEDURE — 36415 COLL VENOUS BLD VENIPUNCTURE: CPT

## 2019-10-10 PROCEDURE — C1769 GUIDE WIRE: HCPCS

## 2019-10-10 PROCEDURE — 99232 SBSQ HOSP IP/OBS MODERATE 35: CPT | Performed by: INTERNAL MEDICINE

## 2019-10-10 PROCEDURE — 6360000002 HC RX W HCPCS

## 2019-10-10 PROCEDURE — 2709999900 HC NON-CHARGEABLE SUPPLY

## 2019-10-10 PROCEDURE — 85652 RBC SED RATE AUTOMATED: CPT

## 2019-10-10 PROCEDURE — 6360000004 HC RX CONTRAST MEDICATION: Performed by: INTERNAL MEDICINE

## 2019-10-10 PROCEDURE — 1200000000 HC SEMI PRIVATE

## 2019-10-10 PROCEDURE — B2151ZZ FLUOROSCOPY OF LEFT HEART USING LOW OSMOLAR CONTRAST: ICD-10-PCS | Performed by: INTERNAL MEDICINE

## 2019-10-10 PROCEDURE — 85027 COMPLETE CBC AUTOMATED: CPT

## 2019-10-10 PROCEDURE — 4A023N7 MEASUREMENT OF CARDIAC SAMPLING AND PRESSURE, LEFT HEART, PERCUTANEOUS APPROACH: ICD-10-PCS | Performed by: INTERNAL MEDICINE

## 2019-10-10 PROCEDURE — 80069 RENAL FUNCTION PANEL: CPT

## 2019-10-10 PROCEDURE — 85730 THROMBOPLASTIN TIME PARTIAL: CPT

## 2019-10-10 PROCEDURE — C9113 INJ PANTOPRAZOLE SODIUM, VIA: HCPCS | Performed by: INTERNAL MEDICINE

## 2019-10-10 RX ORDER — SODIUM CHLORIDE 0.9 % (FLUSH) 0.9 %
10 SYRINGE (ML) INJECTION PRN
Status: DISCONTINUED | OUTPATIENT
Start: 2019-10-10 | End: 2019-10-15 | Stop reason: HOSPADM

## 2019-10-10 RX ORDER — SODIUM CHLORIDE 0.9 % (FLUSH) 0.9 %
10 SYRINGE (ML) INJECTION EVERY 12 HOURS SCHEDULED
Status: DISCONTINUED | OUTPATIENT
Start: 2019-10-10 | End: 2019-10-15 | Stop reason: HOSPADM

## 2019-10-10 RX ORDER — SODIUM CHLORIDE 450 MG/100ML
INJECTION, SOLUTION INTRAVENOUS CONTINUOUS
Status: DISCONTINUED | OUTPATIENT
Start: 2019-10-10 | End: 2019-10-10

## 2019-10-10 RX ORDER — FUROSEMIDE 40 MG/1
40 TABLET ORAL ONCE
Status: COMPLETED | OUTPATIENT
Start: 2019-10-10 | End: 2019-10-10

## 2019-10-10 RX ORDER — LEVOFLOXACIN 5 MG/ML
250 INJECTION, SOLUTION INTRAVENOUS EVERY 24 HOURS
Status: DISCONTINUED | OUTPATIENT
Start: 2019-10-10 | End: 2019-10-14 | Stop reason: ALTCHOICE

## 2019-10-10 RX ORDER — FUROSEMIDE 40 MG/1
40 TABLET ORAL ONCE
Status: DISCONTINUED | OUTPATIENT
Start: 2019-10-10 | End: 2019-10-10

## 2019-10-10 RX ORDER — POTASSIUM CHLORIDE 20 MEQ/1
20 TABLET, EXTENDED RELEASE ORAL ONCE
Status: COMPLETED | OUTPATIENT
Start: 2019-10-10 | End: 2019-10-10

## 2019-10-10 RX ORDER — ACETAMINOPHEN 325 MG/1
650 TABLET ORAL EVERY 4 HOURS PRN
Status: DISCONTINUED | OUTPATIENT
Start: 2019-10-10 | End: 2019-10-15 | Stop reason: HOSPADM

## 2019-10-10 RX ORDER — ONDANSETRON 2 MG/ML
4 INJECTION INTRAMUSCULAR; INTRAVENOUS EVERY 6 HOURS PRN
Status: DISCONTINUED | OUTPATIENT
Start: 2019-10-10 | End: 2019-10-15 | Stop reason: HOSPADM

## 2019-10-10 RX ORDER — POTASSIUM CHLORIDE 7.45 MG/ML
10 INJECTION INTRAVENOUS
Status: COMPLETED | OUTPATIENT
Start: 2019-10-10 | End: 2019-10-10

## 2019-10-10 RX ORDER — OXYCODONE HYDROCHLORIDE AND ACETAMINOPHEN 5; 325 MG/1; MG/1
1 TABLET ORAL EVERY 4 HOURS PRN
Status: DISCONTINUED | OUTPATIENT
Start: 2019-10-10 | End: 2019-10-15 | Stop reason: HOSPADM

## 2019-10-10 RX ORDER — OXYCODONE HYDROCHLORIDE AND ACETAMINOPHEN 5; 325 MG/1; MG/1
2 TABLET ORAL EVERY 4 HOURS PRN
Status: DISCONTINUED | OUTPATIENT
Start: 2019-10-10 | End: 2019-10-15 | Stop reason: HOSPADM

## 2019-10-10 RX ADMIN — POTASSIUM CHLORIDE 10 MEQ: 7.46 INJECTION, SOLUTION INTRAVENOUS at 06:32

## 2019-10-10 RX ADMIN — POTASSIUM CHLORIDE 10 MEQ: 7.46 INJECTION, SOLUTION INTRAVENOUS at 08:23

## 2019-10-10 RX ADMIN — Medication 10 ML: at 20:27

## 2019-10-10 RX ADMIN — FERROUS SULFATE TAB 325 MG (65 MG ELEMENTAL FE) 325 MG: 325 (65 FE) TAB at 08:20

## 2019-10-10 RX ADMIN — HEPARIN SODIUM 2740 UNITS: 1000 INJECTION, SOLUTION INTRAVENOUS; SUBCUTANEOUS at 06:27

## 2019-10-10 RX ADMIN — AMLODIPINE BESYLATE 10 MG: 5 TABLET ORAL at 08:20

## 2019-10-10 RX ADMIN — FERROUS SULFATE TAB 325 MG (65 MG ELEMENTAL FE) 325 MG: 325 (65 FE) TAB at 16:47

## 2019-10-10 RX ADMIN — ASPIRIN 81 MG 81 MG: 81 TABLET ORAL at 08:20

## 2019-10-10 RX ADMIN — DESMOPRESSIN ACETATE 40 MG: 0.2 TABLET ORAL at 08:20

## 2019-10-10 RX ADMIN — PANTOPRAZOLE SODIUM 40 MG: 40 INJECTION, POWDER, FOR SOLUTION INTRAVENOUS at 08:20

## 2019-10-10 RX ADMIN — Medication 10 ML: at 08:21

## 2019-10-10 RX ADMIN — PANTOPRAZOLE SODIUM 40 MG: 40 INJECTION, POWDER, FOR SOLUTION INTRAVENOUS at 20:27

## 2019-10-10 RX ADMIN — FERROUS SULFATE TAB 325 MG (65 MG ELEMENTAL FE) 325 MG: 325 (65 FE) TAB at 12:50

## 2019-10-10 RX ADMIN — LEVOFLOXACIN 250 MG: 5 INJECTION, SOLUTION INTRAVENOUS at 13:01

## 2019-10-10 RX ADMIN — POTASSIUM CHLORIDE 20 MEQ: 1500 TABLET, EXTENDED RELEASE ORAL at 06:27

## 2019-10-10 RX ADMIN — SODIUM CHLORIDE: 9 INJECTION, SOLUTION INTRAVENOUS at 10:43

## 2019-10-10 RX ADMIN — AMLODIPINE BESYLATE 10 MG: 5 TABLET ORAL at 20:27

## 2019-10-10 RX ADMIN — VITAMIN D, TAB 1000IU (100/BT) 2000 UNITS: 25 TAB at 08:20

## 2019-10-10 RX ADMIN — SODIUM BICARBONATE 650 MG TABLET 1300 MG: at 20:27

## 2019-10-10 RX ADMIN — IOPAMIDOL 36 ML: 755 INJECTION, SOLUTION INTRAVENOUS at 14:31

## 2019-10-10 RX ADMIN — HEPARIN SODIUM 13 UNITS/KG/HR: 10000 INJECTION, SOLUTION INTRAVENOUS at 10:43

## 2019-10-10 RX ADMIN — SODIUM BICARBONATE 650 MG TABLET 1300 MG: at 08:20

## 2019-10-10 RX ADMIN — Medication 10 ML: at 20:28

## 2019-10-10 RX ADMIN — FUROSEMIDE 40 MG: 40 TABLET ORAL at 20:28

## 2019-10-10 RX ADMIN — SODIUM BICARBONATE 650 MG TABLET 1300 MG: at 16:47

## 2019-10-10 ASSESSMENT — PAIN SCALES - GENERAL
PAINLEVEL_OUTOF10: 0

## 2019-10-11 LAB
ALBUMIN SERPL-MCNC: 3.1 G/DL (ref 3.4–5)
ANION GAP SERPL CALCULATED.3IONS-SCNC: 14 MMOL/L (ref 3–16)
APTT: 29.2 SEC (ref 26–36)
BUN BLDV-MCNC: 29 MG/DL (ref 7–20)
CALCIUM SERPL-MCNC: 8.3 MG/DL (ref 8.3–10.6)
CHLORIDE BLD-SCNC: 108 MMOL/L (ref 99–110)
CO2: 20 MMOL/L (ref 21–32)
CREAT SERPL-MCNC: 2.2 MG/DL (ref 0.6–1.2)
GFR AFRICAN AMERICAN: 26
GFR NON-AFRICAN AMERICAN: 22
GLUCOSE BLD-MCNC: 88 MG/DL (ref 70–99)
HCT VFR BLD CALC: 25 % (ref 36–48)
HCT VFR BLD CALC: 26.4 % (ref 36–48)
HCT VFR BLD CALC: 27.8 % (ref 36–48)
HCT VFR BLD CALC: 28.2 % (ref 36–48)
HEMOGLOBIN: 8.1 G/DL (ref 12–16)
HEMOGLOBIN: 8.3 G/DL (ref 12–16)
HEMOGLOBIN: 8.8 G/DL (ref 12–16)
HEMOGLOBIN: 8.9 G/DL (ref 12–16)
MCH RBC QN AUTO: 28.4 PG (ref 26–34)
MCHC RBC AUTO-ENTMCNC: 31.6 G/DL (ref 31–36)
MCV RBC AUTO: 89.8 FL (ref 80–100)
PDW BLD-RTO: 15.6 % (ref 12.4–15.4)
PHOSPHORUS: 3.6 MG/DL (ref 2.5–4.9)
PLATELET # BLD: 200 K/UL (ref 135–450)
PMV BLD AUTO: 10.5 FL (ref 5–10.5)
POTASSIUM SERPL-SCNC: 3.8 MMOL/L (ref 3.5–5.1)
RBC # BLD: 2.93 M/UL (ref 4–5.2)
SODIUM BLD-SCNC: 142 MMOL/L (ref 136–145)
WBC # BLD: 14.8 K/UL (ref 4–11)

## 2019-10-11 PROCEDURE — 6360000002 HC RX W HCPCS: Performed by: INTERNAL MEDICINE

## 2019-10-11 PROCEDURE — 85014 HEMATOCRIT: CPT

## 2019-10-11 PROCEDURE — 85730 THROMBOPLASTIN TIME PARTIAL: CPT

## 2019-10-11 PROCEDURE — 2580000003 HC RX 258: Performed by: INTERNAL MEDICINE

## 2019-10-11 PROCEDURE — C9113 INJ PANTOPRAZOLE SODIUM, VIA: HCPCS | Performed by: INTERNAL MEDICINE

## 2019-10-11 PROCEDURE — 1200000000 HC SEMI PRIVATE

## 2019-10-11 PROCEDURE — 85027 COMPLETE CBC AUTOMATED: CPT

## 2019-10-11 PROCEDURE — 85018 HEMOGLOBIN: CPT

## 2019-10-11 PROCEDURE — 6370000000 HC RX 637 (ALT 250 FOR IP): Performed by: INTERNAL MEDICINE

## 2019-10-11 PROCEDURE — 80069 RENAL FUNCTION PANEL: CPT

## 2019-10-11 PROCEDURE — 36415 COLL VENOUS BLD VENIPUNCTURE: CPT

## 2019-10-11 RX ADMIN — FERROUS SULFATE TAB 325 MG (65 MG ELEMENTAL FE) 325 MG: 325 (65 FE) TAB at 13:06

## 2019-10-11 RX ADMIN — VITAMIN D, TAB 1000IU (100/BT) 2000 UNITS: 25 TAB at 09:09

## 2019-10-11 RX ADMIN — ASPIRIN 81 MG 81 MG: 81 TABLET ORAL at 09:08

## 2019-10-11 RX ADMIN — SODIUM BICARBONATE 650 MG TABLET 1300 MG: at 16:40

## 2019-10-11 RX ADMIN — SODIUM BICARBONATE 650 MG TABLET 1300 MG: at 20:31

## 2019-10-11 RX ADMIN — SODIUM BICARBONATE 650 MG TABLET 1300 MG: at 09:08

## 2019-10-11 RX ADMIN — Medication 10 ML: at 09:09

## 2019-10-11 RX ADMIN — Medication 10 ML: at 20:32

## 2019-10-11 RX ADMIN — Medication 10 ML: at 20:30

## 2019-10-11 RX ADMIN — Medication 10 ML: at 09:10

## 2019-10-11 RX ADMIN — FERROUS SULFATE TAB 325 MG (65 MG ELEMENTAL FE) 325 MG: 325 (65 FE) TAB at 09:08

## 2019-10-11 RX ADMIN — LEVOFLOXACIN 250 MG: 5 INJECTION, SOLUTION INTRAVENOUS at 13:06

## 2019-10-11 RX ADMIN — AMLODIPINE BESYLATE 10 MG: 5 TABLET ORAL at 09:08

## 2019-10-11 RX ADMIN — FERROUS SULFATE TAB 325 MG (65 MG ELEMENTAL FE) 325 MG: 325 (65 FE) TAB at 16:40

## 2019-10-11 RX ADMIN — PANTOPRAZOLE SODIUM 40 MG: 40 INJECTION, POWDER, FOR SOLUTION INTRAVENOUS at 20:30

## 2019-10-11 RX ADMIN — AMLODIPINE BESYLATE 10 MG: 5 TABLET ORAL at 20:31

## 2019-10-11 RX ADMIN — DESMOPRESSIN ACETATE 40 MG: 0.2 TABLET ORAL at 09:09

## 2019-10-11 RX ADMIN — DARBEPOETIN ALFA 60 MCG: 60 SOLUTION INTRAVENOUS; SUBCUTANEOUS at 13:06

## 2019-10-11 RX ADMIN — PANTOPRAZOLE SODIUM 40 MG: 40 INJECTION, POWDER, FOR SOLUTION INTRAVENOUS at 09:09

## 2019-10-11 ASSESSMENT — PAIN SCALES - GENERAL
PAINLEVEL_OUTOF10: 0

## 2019-10-12 LAB
ALBUMIN SERPL-MCNC: 2.8 G/DL (ref 3.4–5)
ANION GAP SERPL CALCULATED.3IONS-SCNC: 17 MMOL/L (ref 3–16)
APTT: 30.9 SEC (ref 26–36)
BUN BLDV-MCNC: 28 MG/DL (ref 7–20)
CALCIUM SERPL-MCNC: 8.2 MG/DL (ref 8.3–10.6)
CHLORIDE BLD-SCNC: 105 MMOL/L (ref 99–110)
CO2: 20 MMOL/L (ref 21–32)
CREAT SERPL-MCNC: 2.2 MG/DL (ref 0.6–1.2)
GFR AFRICAN AMERICAN: 26
GFR NON-AFRICAN AMERICAN: 22
GLUCOSE BLD-MCNC: 94 MG/DL (ref 70–99)
HCT VFR BLD CALC: 22.9 % (ref 36–48)
HCT VFR BLD CALC: 25.6 % (ref 36–48)
HCT VFR BLD CALC: 26.1 % (ref 36–48)
HEMOGLOBIN: 7.5 G/DL (ref 12–16)
HEMOGLOBIN: 8.3 G/DL (ref 12–16)
HEMOGLOBIN: 8.5 G/DL (ref 12–16)
MCH RBC QN AUTO: 28.9 PG (ref 26–34)
MCHC RBC AUTO-ENTMCNC: 32.9 G/DL (ref 31–36)
MCV RBC AUTO: 87.9 FL (ref 80–100)
PDW BLD-RTO: 15.1 % (ref 12.4–15.4)
PHOSPHORUS: 3.8 MG/DL (ref 2.5–4.9)
PLATELET # BLD: 218 K/UL (ref 135–450)
PMV BLD AUTO: 10 FL (ref 5–10.5)
POTASSIUM SERPL-SCNC: 3.7 MMOL/L (ref 3.5–5.1)
RBC # BLD: 2.6 M/UL (ref 4–5.2)
SODIUM BLD-SCNC: 142 MMOL/L (ref 136–145)
WBC # BLD: 11.5 K/UL (ref 4–11)

## 2019-10-12 PROCEDURE — 85027 COMPLETE CBC AUTOMATED: CPT

## 2019-10-12 PROCEDURE — 85730 THROMBOPLASTIN TIME PARTIAL: CPT

## 2019-10-12 PROCEDURE — 6360000002 HC RX W HCPCS: Performed by: INTERNAL MEDICINE

## 2019-10-12 PROCEDURE — 2580000003 HC RX 258: Performed by: INTERNAL MEDICINE

## 2019-10-12 PROCEDURE — 1200000000 HC SEMI PRIVATE

## 2019-10-12 PROCEDURE — 80069 RENAL FUNCTION PANEL: CPT

## 2019-10-12 PROCEDURE — 94760 N-INVAS EAR/PLS OXIMETRY 1: CPT

## 2019-10-12 PROCEDURE — 36415 COLL VENOUS BLD VENIPUNCTURE: CPT

## 2019-10-12 PROCEDURE — C9113 INJ PANTOPRAZOLE SODIUM, VIA: HCPCS | Performed by: INTERNAL MEDICINE

## 2019-10-12 PROCEDURE — 85014 HEMATOCRIT: CPT

## 2019-10-12 PROCEDURE — 85018 HEMOGLOBIN: CPT

## 2019-10-12 PROCEDURE — 6370000000 HC RX 637 (ALT 250 FOR IP): Performed by: INTERNAL MEDICINE

## 2019-10-12 RX ADMIN — FERROUS SULFATE TAB 325 MG (65 MG ELEMENTAL FE) 325 MG: 325 (65 FE) TAB at 13:20

## 2019-10-12 RX ADMIN — PANTOPRAZOLE SODIUM 40 MG: 40 INJECTION, POWDER, FOR SOLUTION INTRAVENOUS at 09:51

## 2019-10-12 RX ADMIN — Medication 10 ML: at 09:51

## 2019-10-12 RX ADMIN — ASPIRIN 81 MG 81 MG: 81 TABLET ORAL at 09:51

## 2019-10-12 RX ADMIN — Medication 10 ML: at 21:22

## 2019-10-12 RX ADMIN — SODIUM BICARBONATE 650 MG TABLET 1300 MG: at 09:52

## 2019-10-12 RX ADMIN — FERROUS SULFATE TAB 325 MG (65 MG ELEMENTAL FE) 325 MG: 325 (65 FE) TAB at 09:51

## 2019-10-12 RX ADMIN — PANTOPRAZOLE SODIUM 40 MG: 40 INJECTION, POWDER, FOR SOLUTION INTRAVENOUS at 21:22

## 2019-10-12 RX ADMIN — DESMOPRESSIN ACETATE 40 MG: 0.2 TABLET ORAL at 09:52

## 2019-10-12 RX ADMIN — Medication 10 ML: at 09:53

## 2019-10-12 RX ADMIN — FERROUS SULFATE TAB 325 MG (65 MG ELEMENTAL FE) 325 MG: 325 (65 FE) TAB at 17:28

## 2019-10-12 RX ADMIN — AMLODIPINE BESYLATE 10 MG: 5 TABLET ORAL at 09:52

## 2019-10-12 RX ADMIN — SODIUM BICARBONATE 650 MG TABLET 1300 MG: at 21:22

## 2019-10-12 RX ADMIN — VITAMIN D, TAB 1000IU (100/BT) 2000 UNITS: 25 TAB at 14:54

## 2019-10-12 RX ADMIN — AMLODIPINE BESYLATE 10 MG: 5 TABLET ORAL at 21:22

## 2019-10-12 RX ADMIN — LEVOFLOXACIN 250 MG: 5 INJECTION, SOLUTION INTRAVENOUS at 13:20

## 2019-10-12 RX ADMIN — SODIUM BICARBONATE 650 MG TABLET 1300 MG: at 14:54

## 2019-10-12 ASSESSMENT — PAIN SCALES - GENERAL
PAINLEVEL_OUTOF10: 0

## 2019-10-13 ENCOUNTER — APPOINTMENT (OUTPATIENT)
Dept: GENERAL RADIOLOGY | Age: 74
DRG: 280 | End: 2019-10-13
Payer: MEDICARE

## 2019-10-13 LAB
ALBUMIN SERPL-MCNC: 3.3 G/DL (ref 3.4–5)
ANION GAP SERPL CALCULATED.3IONS-SCNC: 12 MMOL/L (ref 3–16)
APTT: 31.5 SEC (ref 26–36)
BUN BLDV-MCNC: 29 MG/DL (ref 7–20)
CALCIUM SERPL-MCNC: 8.3 MG/DL (ref 8.3–10.6)
CHLORIDE BLD-SCNC: 107 MMOL/L (ref 99–110)
CO2: 26 MMOL/L (ref 21–32)
CREAT SERPL-MCNC: 2.3 MG/DL (ref 0.6–1.2)
GFR AFRICAN AMERICAN: 25
GFR NON-AFRICAN AMERICAN: 21
GLUCOSE BLD-MCNC: 97 MG/DL (ref 70–99)
HCT VFR BLD CALC: 24.2 % (ref 36–48)
HCT VFR BLD CALC: 27 % (ref 36–48)
HEMOGLOBIN: 7.9 G/DL (ref 12–16)
HEMOGLOBIN: 8.5 G/DL (ref 12–16)
MCH RBC QN AUTO: 28.6 PG (ref 26–34)
MCHC RBC AUTO-ENTMCNC: 32.7 G/DL (ref 31–36)
MCV RBC AUTO: 87.5 FL (ref 80–100)
PDW BLD-RTO: 15.2 % (ref 12.4–15.4)
PHOSPHORUS: 4.3 MG/DL (ref 2.5–4.9)
PLATELET # BLD: 276 K/UL (ref 135–450)
PMV BLD AUTO: 9.4 FL (ref 5–10.5)
POTASSIUM SERPL-SCNC: 3.7 MMOL/L (ref 3.5–5.1)
RBC # BLD: 2.77 M/UL (ref 4–5.2)
SODIUM BLD-SCNC: 145 MMOL/L (ref 136–145)
WBC # BLD: 11.1 K/UL (ref 4–11)

## 2019-10-13 PROCEDURE — 71045 X-RAY EXAM CHEST 1 VIEW: CPT

## 2019-10-13 PROCEDURE — 6370000000 HC RX 637 (ALT 250 FOR IP): Performed by: INTERNAL MEDICINE

## 2019-10-13 PROCEDURE — 6360000002 HC RX W HCPCS: Performed by: INTERNAL MEDICINE

## 2019-10-13 PROCEDURE — 85014 HEMATOCRIT: CPT

## 2019-10-13 PROCEDURE — 1200000000 HC SEMI PRIVATE

## 2019-10-13 PROCEDURE — 85730 THROMBOPLASTIN TIME PARTIAL: CPT

## 2019-10-13 PROCEDURE — 2580000003 HC RX 258: Performed by: INTERNAL MEDICINE

## 2019-10-13 PROCEDURE — 80069 RENAL FUNCTION PANEL: CPT

## 2019-10-13 PROCEDURE — 85027 COMPLETE CBC AUTOMATED: CPT

## 2019-10-13 PROCEDURE — C9113 INJ PANTOPRAZOLE SODIUM, VIA: HCPCS | Performed by: INTERNAL MEDICINE

## 2019-10-13 PROCEDURE — 36415 COLL VENOUS BLD VENIPUNCTURE: CPT

## 2019-10-13 PROCEDURE — 85018 HEMOGLOBIN: CPT

## 2019-10-13 RX ORDER — FUROSEMIDE 20 MG/1
20 TABLET ORAL DAILY
Status: DISCONTINUED | OUTPATIENT
Start: 2019-10-13 | End: 2019-10-15 | Stop reason: HOSPADM

## 2019-10-13 RX ADMIN — Medication 10 ML: at 09:11

## 2019-10-13 RX ADMIN — FERROUS SULFATE TAB 325 MG (65 MG ELEMENTAL FE) 325 MG: 325 (65 FE) TAB at 09:10

## 2019-10-13 RX ADMIN — DESMOPRESSIN ACETATE 40 MG: 0.2 TABLET ORAL at 09:11

## 2019-10-13 RX ADMIN — Medication 10 ML: at 21:06

## 2019-10-13 RX ADMIN — FUROSEMIDE 20 MG: 20 TABLET ORAL at 14:10

## 2019-10-13 RX ADMIN — FERROUS SULFATE TAB 325 MG (65 MG ELEMENTAL FE) 325 MG: 325 (65 FE) TAB at 16:53

## 2019-10-13 RX ADMIN — AMLODIPINE BESYLATE 10 MG: 5 TABLET ORAL at 21:05

## 2019-10-13 RX ADMIN — PANTOPRAZOLE SODIUM 40 MG: 40 INJECTION, POWDER, FOR SOLUTION INTRAVENOUS at 09:11

## 2019-10-13 RX ADMIN — SODIUM BICARBONATE 650 MG TABLET 1300 MG: at 09:10

## 2019-10-13 RX ADMIN — ASPIRIN 81 MG 81 MG: 81 TABLET ORAL at 09:11

## 2019-10-13 RX ADMIN — VITAMIN D, TAB 1000IU (100/BT) 2000 UNITS: 25 TAB at 09:10

## 2019-10-13 RX ADMIN — FERROUS SULFATE TAB 325 MG (65 MG ELEMENTAL FE) 325 MG: 325 (65 FE) TAB at 12:12

## 2019-10-13 RX ADMIN — AMLODIPINE BESYLATE 10 MG: 5 TABLET ORAL at 09:10

## 2019-10-13 RX ADMIN — LEVOFLOXACIN 250 MG: 5 INJECTION, SOLUTION INTRAVENOUS at 12:13

## 2019-10-13 RX ADMIN — PANTOPRAZOLE SODIUM 40 MG: 40 INJECTION, POWDER, FOR SOLUTION INTRAVENOUS at 21:05

## 2019-10-13 ASSESSMENT — PAIN SCALES - GENERAL
PAINLEVEL_OUTOF10: 0

## 2019-10-14 LAB
ALBUMIN SERPL-MCNC: 3.2 G/DL (ref 3.4–5)
ANION GAP SERPL CALCULATED.3IONS-SCNC: 14 MMOL/L (ref 3–16)
APTT: 31.2 SEC (ref 26–36)
BUN BLDV-MCNC: 27 MG/DL (ref 7–20)
CALCIUM SERPL-MCNC: 8.1 MG/DL (ref 8.3–10.6)
CHLORIDE BLD-SCNC: 107 MMOL/L (ref 99–110)
CO2: 26 MMOL/L (ref 21–32)
CREAT SERPL-MCNC: 2.3 MG/DL (ref 0.6–1.2)
GFR AFRICAN AMERICAN: 25
GFR NON-AFRICAN AMERICAN: 21
GLUCOSE BLD-MCNC: 93 MG/DL (ref 70–99)
HCT VFR BLD CALC: 25 % (ref 36–48)
HCT VFR BLD CALC: 26.1 % (ref 36–48)
HCT VFR BLD CALC: 26.4 % (ref 36–48)
HEMOGLOBIN: 8.1 G/DL (ref 12–16)
HEMOGLOBIN: 8.3 G/DL (ref 12–16)
HEMOGLOBIN: 8.4 G/DL (ref 12–16)
MCH RBC QN AUTO: 29 PG (ref 26–34)
MCHC RBC AUTO-ENTMCNC: 32.5 G/DL (ref 31–36)
MCV RBC AUTO: 89.1 FL (ref 80–100)
PDW BLD-RTO: 15.5 % (ref 12.4–15.4)
PHOSPHORUS: 4.5 MG/DL (ref 2.5–4.9)
PLATELET # BLD: 287 K/UL (ref 135–450)
PMV BLD AUTO: 9.5 FL (ref 5–10.5)
POTASSIUM SERPL-SCNC: 3.6 MMOL/L (ref 3.5–5.1)
RBC # BLD: 2.81 M/UL (ref 4–5.2)
SODIUM BLD-SCNC: 147 MMOL/L (ref 136–145)
WBC # BLD: 10.9 K/UL (ref 4–11)

## 2019-10-14 PROCEDURE — 85027 COMPLETE CBC AUTOMATED: CPT

## 2019-10-14 PROCEDURE — 2580000003 HC RX 258: Performed by: INTERNAL MEDICINE

## 2019-10-14 PROCEDURE — 85018 HEMOGLOBIN: CPT

## 2019-10-14 PROCEDURE — 6370000000 HC RX 637 (ALT 250 FOR IP): Performed by: INTERNAL MEDICINE

## 2019-10-14 PROCEDURE — 6360000002 HC RX W HCPCS: Performed by: INTERNAL MEDICINE

## 2019-10-14 PROCEDURE — 85730 THROMBOPLASTIN TIME PARTIAL: CPT

## 2019-10-14 PROCEDURE — 80069 RENAL FUNCTION PANEL: CPT

## 2019-10-14 PROCEDURE — 85014 HEMATOCRIT: CPT

## 2019-10-14 PROCEDURE — C9113 INJ PANTOPRAZOLE SODIUM, VIA: HCPCS | Performed by: INTERNAL MEDICINE

## 2019-10-14 PROCEDURE — 1200000000 HC SEMI PRIVATE

## 2019-10-14 PROCEDURE — 36415 COLL VENOUS BLD VENIPUNCTURE: CPT

## 2019-10-14 RX ADMIN — DESMOPRESSIN ACETATE 40 MG: 0.2 TABLET ORAL at 09:08

## 2019-10-14 RX ADMIN — Medication 10 ML: at 20:35

## 2019-10-14 RX ADMIN — Medication 10 ML: at 09:09

## 2019-10-14 RX ADMIN — Medication 10 ML: at 09:08

## 2019-10-14 RX ADMIN — FERROUS SULFATE TAB 325 MG (65 MG ELEMENTAL FE) 325 MG: 325 (65 FE) TAB at 17:31

## 2019-10-14 RX ADMIN — PANTOPRAZOLE SODIUM 40 MG: 40 INJECTION, POWDER, FOR SOLUTION INTRAVENOUS at 20:35

## 2019-10-14 RX ADMIN — FERROUS SULFATE TAB 325 MG (65 MG ELEMENTAL FE) 325 MG: 325 (65 FE) TAB at 13:19

## 2019-10-14 RX ADMIN — Medication 10 ML: at 20:36

## 2019-10-14 RX ADMIN — FUROSEMIDE 20 MG: 20 TABLET ORAL at 09:09

## 2019-10-14 RX ADMIN — ASPIRIN 81 MG 81 MG: 81 TABLET ORAL at 09:09

## 2019-10-14 RX ADMIN — FERROUS SULFATE TAB 325 MG (65 MG ELEMENTAL FE) 325 MG: 325 (65 FE) TAB at 09:09

## 2019-10-14 RX ADMIN — VITAMIN D, TAB 1000IU (100/BT) 2000 UNITS: 25 TAB at 09:08

## 2019-10-14 RX ADMIN — AMLODIPINE BESYLATE 10 MG: 5 TABLET ORAL at 09:08

## 2019-10-14 RX ADMIN — PANTOPRAZOLE SODIUM 40 MG: 40 INJECTION, POWDER, FOR SOLUTION INTRAVENOUS at 09:08

## 2019-10-14 RX ADMIN — AMLODIPINE BESYLATE 10 MG: 5 TABLET ORAL at 20:35

## 2019-10-14 RX ADMIN — LEVOFLOXACIN 250 MG: 5 INJECTION, SOLUTION INTRAVENOUS at 13:19

## 2019-10-14 ASSESSMENT — PAIN SCALES - GENERAL
PAINLEVEL_OUTOF10: 0

## 2019-10-15 VITALS
RESPIRATION RATE: 18 BRPM | WEIGHT: 147.4 LBS | HEART RATE: 102 BPM | DIASTOLIC BLOOD PRESSURE: 65 MMHG | BODY MASS INDEX: 26.12 KG/M2 | TEMPERATURE: 97.8 F | OXYGEN SATURATION: 98 % | HEIGHT: 63 IN | SYSTOLIC BLOOD PRESSURE: 128 MMHG

## 2019-10-15 LAB
ALBUMIN SERPL-MCNC: 3.6 G/DL (ref 3.4–5)
ANION GAP SERPL CALCULATED.3IONS-SCNC: 14 MMOL/L (ref 3–16)
BUN BLDV-MCNC: 26 MG/DL (ref 7–20)
CALCIUM SERPL-MCNC: 8.6 MG/DL (ref 8.3–10.6)
CHLORIDE BLD-SCNC: 106 MMOL/L (ref 99–110)
CO2: 24 MMOL/L (ref 21–32)
CREAT SERPL-MCNC: 2.1 MG/DL (ref 0.6–1.2)
GFR AFRICAN AMERICAN: 28
GFR NON-AFRICAN AMERICAN: 23
GLUCOSE BLD-MCNC: 116 MG/DL (ref 70–99)
HCT VFR BLD CALC: 24.5 % (ref 36–48)
HCT VFR BLD CALC: 27.8 % (ref 36–48)
HEMOGLOBIN: 7.9 G/DL (ref 12–16)
HEMOGLOBIN: 9 G/DL (ref 12–16)
MCH RBC QN AUTO: 28.8 PG (ref 26–34)
MCHC RBC AUTO-ENTMCNC: 32.4 G/DL (ref 31–36)
MCV RBC AUTO: 89 FL (ref 80–100)
PDW BLD-RTO: 15.7 % (ref 12.4–15.4)
PHOSPHORUS: 4.7 MG/DL (ref 2.5–4.9)
PLATELET # BLD: 358 K/UL (ref 135–450)
PMV BLD AUTO: 9 FL (ref 5–10.5)
POTASSIUM SERPL-SCNC: 3.8 MMOL/L (ref 3.5–5.1)
RBC # BLD: 3.12 M/UL (ref 4–5.2)
SODIUM BLD-SCNC: 144 MMOL/L (ref 136–145)
WBC # BLD: 12.2 K/UL (ref 4–11)

## 2019-10-15 PROCEDURE — 36415 COLL VENOUS BLD VENIPUNCTURE: CPT

## 2019-10-15 PROCEDURE — 2700000000 HC OXYGEN THERAPY PER DAY

## 2019-10-15 PROCEDURE — 6360000002 HC RX W HCPCS: Performed by: INTERNAL MEDICINE

## 2019-10-15 PROCEDURE — 6370000000 HC RX 637 (ALT 250 FOR IP): Performed by: INTERNAL MEDICINE

## 2019-10-15 PROCEDURE — 80069 RENAL FUNCTION PANEL: CPT

## 2019-10-15 PROCEDURE — C9113 INJ PANTOPRAZOLE SODIUM, VIA: HCPCS | Performed by: INTERNAL MEDICINE

## 2019-10-15 PROCEDURE — 94760 N-INVAS EAR/PLS OXIMETRY 1: CPT

## 2019-10-15 PROCEDURE — 85018 HEMOGLOBIN: CPT

## 2019-10-15 PROCEDURE — 2580000003 HC RX 258: Performed by: INTERNAL MEDICINE

## 2019-10-15 PROCEDURE — 85027 COMPLETE CBC AUTOMATED: CPT

## 2019-10-15 PROCEDURE — 85014 HEMATOCRIT: CPT

## 2019-10-15 RX ORDER — PANTOPRAZOLE SODIUM 40 MG/1
40 TABLET, DELAYED RELEASE ORAL
Qty: 60 TABLET | Refills: 0 | Status: SHIPPED | OUTPATIENT
Start: 2019-10-15 | End: 2020-10-27

## 2019-10-15 RX ORDER — FUROSEMIDE 20 MG/1
20 TABLET ORAL DAILY
Qty: 60 TABLET | Refills: 0 | Status: SHIPPED | OUTPATIENT
Start: 2019-10-16 | End: 2020-10-27

## 2019-10-15 RX ORDER — ATORVASTATIN CALCIUM 40 MG/1
40 TABLET, FILM COATED ORAL DAILY
Qty: 30 TABLET | Refills: 0 | Status: SHIPPED | OUTPATIENT
Start: 2019-10-16

## 2019-10-15 RX ORDER — ASPIRIN 81 MG/1
81 TABLET, CHEWABLE ORAL DAILY
Qty: 30 TABLET | Refills: 3 | Status: SHIPPED | OUTPATIENT
Start: 2019-10-16

## 2019-10-15 RX ADMIN — FUROSEMIDE 20 MG: 20 TABLET ORAL at 10:35

## 2019-10-15 RX ADMIN — FERROUS SULFATE TAB 325 MG (65 MG ELEMENTAL FE) 325 MG: 325 (65 FE) TAB at 10:34

## 2019-10-15 RX ADMIN — PANTOPRAZOLE SODIUM 40 MG: 40 INJECTION, POWDER, FOR SOLUTION INTRAVENOUS at 10:34

## 2019-10-15 RX ADMIN — ASPIRIN 81 MG 81 MG: 81 TABLET ORAL at 10:34

## 2019-10-15 RX ADMIN — Medication 10 ML: at 10:35

## 2019-10-15 RX ADMIN — VITAMIN D, TAB 1000IU (100/BT) 2000 UNITS: 25 TAB at 10:35

## 2019-10-15 RX ADMIN — AMLODIPINE BESYLATE 10 MG: 5 TABLET ORAL at 10:34

## 2019-10-15 RX ADMIN — DESMOPRESSIN ACETATE 40 MG: 0.2 TABLET ORAL at 10:34

## 2019-10-15 ASSESSMENT — PAIN SCALES - GENERAL
PAINLEVEL_OUTOF10: 0
PAINLEVEL_OUTOF10: 0

## 2019-11-05 PROBLEM — R79.89 ELEVATED TROPONIN: Status: RESOLVED | Noted: 2019-10-06 | Resolved: 2019-11-05

## 2019-11-05 PROBLEM — R77.8 ELEVATED TROPONIN: Status: RESOLVED | Noted: 2019-10-06 | Resolved: 2019-11-05

## 2019-12-10 ENCOUNTER — OFFICE VISIT (OUTPATIENT)
Dept: CARDIOLOGY CLINIC | Age: 74
End: 2019-12-10
Payer: MEDICARE

## 2019-12-10 VITALS
WEIGHT: 144 LBS | HEIGHT: 63 IN | BODY MASS INDEX: 25.52 KG/M2 | DIASTOLIC BLOOD PRESSURE: 66 MMHG | HEART RATE: 104 BPM | OXYGEN SATURATION: 99 % | SYSTOLIC BLOOD PRESSURE: 134 MMHG

## 2019-12-10 DIAGNOSIS — I38 VALVULAR HEART DISEASE: ICD-10-CM

## 2019-12-10 DIAGNOSIS — I21.4 NSTEMI (NON-ST ELEVATED MYOCARDIAL INFARCTION) (HCC): Primary | ICD-10-CM

## 2019-12-10 DIAGNOSIS — I10 ESSENTIAL HYPERTENSION: ICD-10-CM

## 2019-12-10 PROCEDURE — 99214 OFFICE O/P EST MOD 30 MIN: CPT | Performed by: NURSE PRACTITIONER

## 2019-12-10 RX ORDER — LISINOPRIL 40 MG/1
40 TABLET ORAL 2 TIMES DAILY
COMMUNITY
End: 2020-10-27

## 2019-12-12 ENCOUNTER — TELEPHONE (OUTPATIENT)
Dept: CARDIOLOGY CLINIC | Age: 74
End: 2019-12-12

## 2019-12-12 RX ORDER — METOPROLOL SUCCINATE 25 MG/1
25 TABLET, EXTENDED RELEASE ORAL DAILY
Qty: 30 TABLET | Refills: 3 | Status: SHIPPED | OUTPATIENT
Start: 2019-12-12 | End: 2020-04-09

## 2020-04-09 RX ORDER — METOPROLOL SUCCINATE 25 MG/1
TABLET, EXTENDED RELEASE ORAL
Qty: 30 TABLET | Refills: 2 | Status: SHIPPED | OUTPATIENT
Start: 2020-04-09 | End: 2020-10-27

## 2020-10-27 ENCOUNTER — OFFICE VISIT (OUTPATIENT)
Dept: CARDIOLOGY CLINIC | Age: 75
End: 2020-10-27
Payer: MEDICARE

## 2020-10-27 VITALS
SYSTOLIC BLOOD PRESSURE: 132 MMHG | HEIGHT: 63 IN | OXYGEN SATURATION: 99 % | HEART RATE: 109 BPM | BODY MASS INDEX: 26.58 KG/M2 | DIASTOLIC BLOOD PRESSURE: 52 MMHG | WEIGHT: 150 LBS

## 2020-10-27 PROCEDURE — 99214 OFFICE O/P EST MOD 30 MIN: CPT | Performed by: NURSE PRACTITIONER

## 2020-10-27 RX ORDER — METOPROLOL SUCCINATE 25 MG/1
TABLET, EXTENDED RELEASE ORAL
Qty: 30 TABLET | Refills: 2 | Status: SHIPPED | OUTPATIENT
Start: 2020-10-27 | End: 2021-01-25

## 2020-10-27 RX ORDER — ONDANSETRON HYDROCHLORIDE 8 MG/1
8 TABLET, FILM COATED ORAL EVERY 8 HOURS PRN
COMMUNITY
Start: 2020-10-22 | End: 2021-04-12 | Stop reason: ALTCHOICE

## 2020-10-27 NOTE — PROGRESS NOTES
[address]     Outpatient Follow Up Note    Duane Rung is 66 y.o. female who presents today with a history of NSTEMI, apical HK, non-obst CAD and valvular disease. CHIEF COMPLAINT / HPI:  Follow Up secondary to NSTEMI / apical HK    Subjective:   She's been having heartburn most bothersome after meals. She denies (d) significant chest pain. Her angina was flu-like symptoms, weak with n/v. There is SOB now/then when doing something physical she's not used to. The patient denies orthopnea/PND. The patient does not have swelling. The patients weight fluctuates b/w 148-52# . The patient is not experiencing palpitations or dizziness. She was sitting at the table and made a toast with a glass of wine. She moved to the living room, felt nauseated. She vomited and woke with others around her. She was told that she was out for a couple of minutes. She had no symptoms of fluttering, warm or sweating before her event. This happened about at 1 1/2 weeks ago. What is of concern to her is the burning in her esophagus since. She has reflux when she lays down and trying to turn over. These symptoms are new since the last OV Dec '19. With regard to medication therapy the patient has been compliant with prescribed regimen. They have tolerated therapy to date.      Past Medical History:   Diagnosis Date    CKD (chronic kidney disease)     stage 3, follows with [facility]    Hyperlipidemia     Hypertension     Water retention     ankle swelling     Social History:    Social History     Tobacco Use   Smoking Status Current Every Day Smoker    Packs/day: 0.75    Types: Cigarettes   Smokeless Tobacco Never Used     Current Medications:  Current Outpatient Medications   Medication Sig Dispense Refill    epoetin ernesto (PROCRIT) 96681 UNIT/ML injection Infuse 6,800 Units intravenously every 14 days If Hgb < 10      sodium zirconium cyclosilicate (LOKELMA) 10 g PACK oral suspension Take 10 g by mouth daily      aspirin 81 MG chewable tablet Take 1 tablet by mouth daily 30 tablet 3    atorvastatin (LIPITOR) 40 MG tablet Take 1 tablet by mouth daily 30 tablet 0    amLODIPine (NORVASC) 10 MG tablet Take 1 tablet by mouth 2 times daily      ferrous sulfate 325 (65 Fe) MG tablet Take 1 tablet by mouth 3 times daily (with meals)       sodium bicarbonate 650 MG tablet Take 2 tablets by mouth 3 times daily      allopurinol (ZYLOPRIM) 100 MG tablet Take 1 tablet by mouth daily      Cholecalciferol (VITAMIN D3) 2000 UNITS CAPS Take 1 capsule by mouth daily      ondansetron (ZOFRAN) 8 MG tablet Take 8 mg by mouth every 8 hours as needed       No current facility-administered medications for this visit. REVIEW OF SYSTEMS:    CONSTITUTIONAL: No major weight gain or loss, fatigue, weakness, night sweats or fever. HEENT: No new vision difficulties or ringing in the ears. RESPIRATORY: No new SOB, PND, orthopnea or cough. CARDIOVASCULAR: See HPI  GI: No nausea, vomiting, diarrhea, + constipation (taking Lokelma) , abdominal pain or changes in bowel habits. : No urinary frequency, urgency, incontinence hematuria or dysuria. SKIN: No cyanosis or skin lesions. MUSCULOSKELETAL: No new muscle or joint pain. NEUROLOGICAL: No syncope or TIA-like symptoms.   PSYCHIATRIC: No anxiety, pain, insomnia or depression    Objective:   PHYSICAL EXAM:       Vitals:    10/27/20 1020 10/27/20 1058   BP: 130/60 (!) 132/52   Site: Right Upper Arm    Position: Sitting    Cuff Size: Medium Adult    Pulse: 109    SpO2: 99%    Weight: 150 lb (68 kg)    Height: 5' 2.5\" (1.588 m)         VITALS:  /60 (Site: Right Upper Arm, Position: Sitting, Cuff Size: Medium Adult)   Pulse 109   Ht 5' 2.5\" (1.588 m)   Wt 150 lb (68 kg)   SpO2 99%   BMI 27.00 kg/m²   CONSTITUTIONAL: Cooperative, no apparent distress, and appears well nourished / developed  NEUROLOGIC:  Awake and orientated to person, place and time.  PSYCH: Calm affect. SKIN: Warm and dry. HEENT: Sclera non-icteric, normocephalic, neck supple, no elevation of JVP, normal carotid pulses with no bruits and thyroid normal size. LUNGS:  No increased work of breathing and clear to auscultation, no crackles or wheezing  CARDIOVASCULAR:  Regular rate  and rhythm with no murmurs, gallops, rubs, or abnormal heart sounds, normal PMI. The apical impulses not displaced  JVP less than 8 cm H2O  Heart tones are crisp and normal  Cervical veins are not engorged  The carotid upstroke is normal in amplitude and contour without delay or bruit  JVP is not elevated  ABDOMEN:  Normal bowel sounds, non-distended and non-tender to palpation  EXT: No edema, no calf tenderness. Pulses are present bilaterally. DATA:    Lab Results   Component Value Date    ALT 9 (L) 10/06/2019    AST 23 10/06/2019    ALKPHOS 86 10/06/2019    BILITOT <0.2 10/06/2019     Lab Results   Component Value Date    CREATININE 2.1 (H) 10/15/2019    BUN 26 (H) 10/15/2019     10/15/2019    K 3.8 10/15/2019     10/15/2019    CO2 24 10/15/2019     No results found for: TSH, K9XEJFK, E0YAXXM, THYROIDAB  Lab Results   Component Value Date    WBC 12.2 (H) 10/15/2019    HGB 9.0 (L) 10/15/2019    HCT 27.8 (L) 10/15/2019    MCV 89.0 10/15/2019     10/15/2019     LABS: 1/27/20:    trig 127 HDL 55      10/26/20:   Na+ 139 K+ 4.9 chl 107 CO2 23 BUN 52 creatinine 3.27 glu 87 GFR 15   H/H 8.1 / 25.7    Radiology Review:  Pertinent images / reports were reviewed as a part of this visit and reveals the following:    Last Echo: Oct '19:  Summary  Left ventricle - normal size and function with EF of 60%  Wall motion - apical hypokinesis  Left atrial enlargement noted.   Aortic valve - thickened and calcified, mild stenosis with mean gradient 10mmHg, no regurgitation  Mitral valve - annular calcification, thickened and calcified with peak velocity of 2.17 m/s and a mean pressure gradient of 7 mmHg consistent with stenosis, mild regurgitation, area 2.75cm2 per p1/2t  Tricuspid valve - moderate regurgitation with RVSP of 38mmHg   ~Late peaking gradient recorded across the LV outflow tract consistent with dynamic obstruction with a peak gradient of 100 mmHg. Mitral annular calcification appears to override LVOT. Last Angiogram: 10/10/19   LM-Normal No disease   LAD-prox 20%, mid 20% diffuse luminal irregularities  Cx-Large vessel. Mild luminal irregularities  OM- mid 40%  RCA-Dominant, proximal 40% diffuse ectasia  RPDA- Normal no disease  LVEDP- 25    EKG: 10/22/20:  No acute ischemic or injury pattern T waves may be a little bit prominent in V3 and V4 compatible with her hyperkalemia    Assessment:      Diagnosis Orders   1. Coronary artery disease involving native coronary artery of native heart without angina pectoris   ~mild, non-obst disease by cath Oct '19  ~atypical discomfort experienced after meals and when laying down. No longer taking PPI  ~remains on amlodipine / ASA / atorvastatin     2. Valvular heart disease   ~mild MR, mod stenosis; and aortic regurg Echo 2D w doppler w color complete   3. Essential hypertension   ~controlled on current regimen  ~off lisinopril d/t hyperkalemia    4. Syncope, unspecified syncope type   ~new experienced approx 2 weeks ago  ~abnl echo Oct '19 with LV outflow tract consistent with dynamic obst. Had not followed up with I since December '19        I had the opportunity to review the clinical symptoms and presentation of Ben Diaz. Plan:     1. Echocardiogram reassess valves and LVOT  2. Resume toprol 25 mg daily   3. F/U in 6 weeks    Overall the patient is stable from CV standpoint    I have addresed the patient's cardiac risk factors and adjusted pharmacologic treatment as needed. In addition, I have reinforced the need for patient directed risk factor modification.     Further evaluation will be based upon the patient's clinical course and testing results. All questions and concerns were addressed to the patient. Alternatives to my treatment were discussed. The patient is not currently smoking. The risks related to smoking were reviewed with the patient. Recommend maintaining a smoke-free lifestyle. Patient is on a beta-blocker >> ran out ~ 2 months ago  Patient is not on an ace-i/ARB: stopped by NE with hyperkalemia, stage IV followed by Dr. Suly Dave  Patient is on a statin     Antiplatelet therapy has been recommended / prescribed for this patient. Education conducted on adverse reactions including bleeding was discussed. The patient verbalizes understanding not to stop medications without discussing with us. Discussed exercise: 30-60 minutes 7 days/week  Discussed Low saturated fat diet. Thank you for allowing to us to participate in the care of Jefrfy Ortega.     BEATRIZ Mcdonnell    Documentation of today's visit sent to PCP

## 2020-10-27 NOTE — PATIENT INSTRUCTIONS
Resume toprol 25 mg once daily     Echocardiogram     appt in 6 weeks    You can take OTC stool softener : colace 100 mg 2-3 times a day

## 2020-12-14 ENCOUNTER — HOSPITAL ENCOUNTER (OUTPATIENT)
Dept: NON INVASIVE DIAGNOSTICS | Age: 75
Discharge: HOME | End: 2020-12-14
Payer: MEDICARE

## 2020-12-14 ENCOUNTER — OFFICE VISIT (OUTPATIENT)
Dept: CARDIOLOGY CLINIC | Age: 75
End: 2020-12-14
Payer: MEDICARE

## 2020-12-14 VITALS
DIASTOLIC BLOOD PRESSURE: 62 MMHG | OXYGEN SATURATION: 99 % | BODY MASS INDEX: 26.22 KG/M2 | HEART RATE: 79 BPM | HEIGHT: 63 IN | WEIGHT: 148 LBS | SYSTOLIC BLOOD PRESSURE: 134 MMHG

## 2020-12-14 LAB
LV EF: 63 %
LVEF MODALITY: NORMAL

## 2020-12-14 PROCEDURE — 99214 OFFICE O/P EST MOD 30 MIN: CPT | Performed by: NURSE PRACTITIONER

## 2020-12-14 PROCEDURE — 93306 TTE W/DOPPLER COMPLETE: CPT

## 2020-12-14 NOTE — PROGRESS NOTES
[facility]     Outpatient Follow Up Note    Aylin Chatman is 66 y.o. female who presents today with a history of NSTEMI, apical HK, non-obst CAD and valvular disease. CHIEF COMPLAINT / HPI:  Follow Up secondary to NSTEMI / apical HK starting metoprolol     Subjective:     She denies chest discomfort. Her angina was flu-like symptoms, weak with n/v.  She's had no SOB. The patient denies orthopnea/PND. The patient has some swelling in her legs by the end of the day. Support socks help. The patients weight fluctuates b/w 148-52# at 149# today . The patient is not experiencing palpitations or dizziness. Her BP runs ~ 135-145/    These symptoms are stable since the last OV. With regard to medication therapy the patient has been compliant with prescribed regimen. They have tolerated therapy to date.      Past Medical History:   Diagnosis Date    CKD (chronic kidney disease)     stage 3, follows with kidney and HT[facility] center    Hyperlipidemia     Hypertension     Water retention     ankle swelling     Social History:    Social History     Tobacco Use   Smoking Status Current Every Day Smoker    Packs/day: 0.75    Types: Cigarettes   Smokeless Tobacco Never Used     Current Medications:  Current Outpatient Medications   Medication Sig Dispense Refill    epoetin ernesto (PROCRIT) 24511 UNIT/ML injection Infuse 6,800 Units intravenously every 14 days If Hgb < 10      sodium zirconium cyclosilicate (LOKELMA) 10 g PACK oral suspension Take 10 g by mouth daily      ondansetron (ZOFRAN) 8 MG tablet Take 8 mg by mouth every 8 hours as needed      metoprolol succinate (TOPROL XL) 25 MG extended release tablet TAKE ONE TABLET BY MOUTH DAILY 30 tablet 2    aspirin 81 MG chewable tablet Take 1 tablet by mouth daily 30 tablet 3    atorvastatin (LIPITOR) 40 MG tablet Take 1 tablet by mouth daily 30 tablet 0    amLODIPine (NORVASC) 10 MG tablet Take 1 tablet by mouth 2 times daily      ferrous sulfate 325 (65 Fe) MG tablet Take 1 tablet by mouth 3 times daily (with meals)       sodium bicarbonate 650 MG tablet Take 2 tablets by mouth 3 times daily      allopurinol (ZYLOPRIM) 100 MG tablet Take 1 tablet by mouth daily      Cholecalciferol (VITAMIN D3) 2000 UNITS CAPS Take 1 capsule by mouth daily       No current facility-administered medications for this visit. REVIEW OF SYSTEMS:    CONSTITUTIONAL: No major weight gain or loss, fatigue, weakness, night sweats or fever. HEENT: No new vision difficulties or ringing in the ears. RESPIRATORY: No new SOB, PND, orthopnea or cough. CARDIOVASCULAR: See HPI  GI: No nausea, vomiting, diarrhea, + constipation (taking Lokelma) , abdominal pain or changes in bowel habits. : No urinary frequency, urgency, incontinence hematuria or dysuria. SKIN: No cyanosis or skin lesions. MUSCULOSKELETAL: No new muscle or joint pain. NEUROLOGICAL: No syncope or TIA-like symptoms. PSYCHIATRIC: No anxiety, pain, insomnia or depression    Objective:   PHYSICAL EXAM:       Vitals:    12/14/20 1105 12/14/20 1118   BP: (!) 110/50 134/62   Site: Right Upper Arm Right Upper Arm   Position: Sitting    Cuff Size: Medium Adult Medium Adult   Pulse: 79    SpO2: 99%    Weight: 148 lb (67.1 kg)    Height: 5' 2.5\" (1.588 m)         VITALS:  BP (!) 110/50 (Site: Right Upper Arm, Position: Sitting, Cuff Size: Medium Adult)   Pulse 79   Ht 5' 2.5\" (1.588 m)   Wt 148 lb (67.1 kg)   SpO2 99%   BMI 26.64 kg/m²   CONSTITUTIONAL: Cooperative, no apparent distress, and appears well nourished / developed  NEUROLOGIC:  Awake and orientated to person, place and time. PSYCH: Calm affect. SKIN: Warm and dry. HEENT: Sclera non-icteric, normocephalic, neck supple, no elevation of JVP, normal carotid pulses with no bruits and thyroid normal size.   LUNGS:  No increased work of breathing and clear to auscultation, no crackles or wheezing  CARDIOVASCULAR:  Regular rate 80 and rhythm with 4th ICS Lt MCL murmurs, gallops, rubs, or abnormal heart sounds, normal PMI. The apical impulses not displaced  JVP less than 8 cm H2O  Heart tones are crisp and normal  Cervical veins are not engorged  The carotid upstroke is normal in amplitude and contour without delay or bruit  JVP is not elevated  ABDOMEN:  Normal bowel sounds, non-distended and non-tender to palpation  EXT: No edema, no calf tenderness. Pulses are present bilaterally. DATA:    Lab Results   Component Value Date    ALT 9 (L) 10/06/2019    AST 23 10/06/2019    ALKPHOS 86 10/06/2019    BILITOT <0.2 10/06/2019     Lab Results   Component Value Date    CREATININE 2.1 (H) 10/15/2019    BUN 26 (H) 10/15/2019     10/15/2019    K 3.8 10/15/2019     10/15/2019    CO2 24 10/15/2019     No results found for: TSH, D8IAMMN, F0UQEUH, THYROIDAB  Lab Results   Component Value Date    WBC 12.2 (H) 10/15/2019    HGB 9.0 (L) 10/15/2019    HCT 27.8 (L) 10/15/2019    MCV 89.0 10/15/2019     10/15/2019     LABS: 1/27/20:    trig 127 HDL 55      10/26/20:   Na+ 139 K+ 4.9 chl 107 CO2 23 BUN 52 creatinine 3.27 glu 87 GFR 15   H/H 8.1 / 25.7    Radiology Review:  Pertinent images / reports were reviewed as a part of this visit and reveals the following:    Echo: Oct '19:  Summary  Left ventricle - normal size and function with EF of 60%  Wall motion - apical hypokinesis  Left atrial enlargement noted. Aortic valve - thickened and calcified, mild stenosis with mean gradient 10mmHg, no regurgitation  Mitral valve - annular calcification, thickened and calcified with peak velocity of 2.17 m/s and a mean pressure gradient of 7 mmHg consistent with stenosis, mild regurgitation, area 2.75cm2 per p1/2t  Tricuspid valve - moderate regurgitation with RVSP of 38mmHg   ~Late peaking gradient recorded across the LV outflow tract consistent with dynamic obstruction with a peak gradient of 100 mmHg. Mitral annular calcification appears to override LVOT.      Last Angiogram: 10/10/19   LM-Normal No disease   LAD-prox 20%, mid 20% diffuse luminal irregularities  Cx-Large vessel. Mild luminal irregularities  OM- mid 40%  RCA-Dominant, proximal 40% diffuse ectasia  RPDA- Normal no disease  LVEDP- 25    EKG: 10/22/20:  No acute ischemic or injury pattern T waves may be a little bit prominent in V3 and V4 compatible with her hyperkalemia    Echo: 12/14/20:   Summary   -Left ventricular cavity size is normal.   -Overall left ventricular systolic function appears normal.   -Ejection fraction is visually estimated to be 60-65%. -There is mild concentric left ventricular hypertrophy.   -No regional wall motion abnormalities are noted. -Grade I diastolic dysfunction with normal LV filling pressures. E/e' =   24.2.   -The aortic valve appears sclerotic but opens well. -There is heavy mitral annular calcification.   -Mitral valve Vmax is 2.2 cm/s. Mean pressure gradient is 7 mmHG. -Volume index is normal, but left atrium appears dilated. -Moderate mitral regurgitation.   -Mild tricuspid regurgitation.   -Aortic valve Vmax is 1.7 cm/s and the mean gradient is 5 mmHG. Assessment:      Diagnosis Orders   1. Coronary artery disease involving native coronary artery of native heart without angina pectoris   ~stable : denies angina  ~normal LVEF by echo today  ~mild, non-obst disease by cath Oct '19  ~amlodipine / ASA / atorvastatin     2. Valvular heart disease   ~progression of MR noted as moderate; heavy annular calcification   ~mild MR, mod stenosis; and aortic regurg in Oct '19  ~offers no c/o SOB / swelling     3. Essential hypertension   ~stable / controlled   ~off lisinopril d/t hyperkalemia    4.  Syncope, unspecified syncope type   ~denies recurrence   ~echo with MR / TR; aortic sclerotic but opens well   ~abnl echo Oct '19 with LV outflow tract consistent with dynamic obst. Had not followed up with [facility] since December '19        I had the opportunity to review the clinical symptoms and presentation of Tyree Blount. Plan:     1. Event monitor for reported syncope   2. F/U in four months    Overall the patient is stable from CV standpoint    I have addresed the patient's cardiac risk factors and adjusted pharmacologic treatment as needed. In addition, I have reinforced the need for patient directed risk factor modification. Further evaluation will be based upon the patient's clinical course and testing results. All questions and concerns were addressed to the patient. Alternatives to my treatment were discussed. The patient is not currently smoking. The risks related to smoking were reviewed with the patient. Recommend maintaining a smoke-free lifestyle. Patient is on a beta-blocker   Patient is not on an ace-i/ARB: stopped by NE with hyperkalemia, stage IV followed by Dr. Amanda Russo  Patient is on a statin     Antiplatelet therapy has been recommended / prescribed for this patient. Education conducted on adverse reactions including bleeding was discussed. The patient verbalizes understanding not to stop medications without discussing with us. Discussed exercise: 30-60 minutes 7 days/week  Discussed Low saturated fat diet. Thank you for allowing to us to participate in the care of Tyree Blount.     BEATRIZ Myers    Documentation of today's visit sent to PCP

## 2020-12-16 PROCEDURE — 93228 REMOTE 30 DAY ECG REV/REPORT: CPT | Performed by: INTERNAL MEDICINE

## 2021-01-25 RX ORDER — METOPROLOL SUCCINATE 25 MG/1
TABLET, EXTENDED RELEASE ORAL
Qty: 30 TABLET | Refills: 1 | Status: SHIPPED | OUTPATIENT
Start: 2021-01-25 | End: 2021-03-30

## 2021-01-26 DIAGNOSIS — R55 SYNCOPE, UNSPECIFIED SYNCOPE TYPE: ICD-10-CM

## 2021-01-29 ENCOUNTER — TELEPHONE (OUTPATIENT)
Dept: CARDIOLOGY CLINIC | Age: 76
End: 2021-01-29

## 2021-01-29 NOTE — TELEPHONE ENCOUNTER
Medication Refill    Medication needing refilled:metoprolol    Dosage of the medication:    How are you taking this medication (QD, BID, TID, QID, PRN):    30 or 90 day supply called in:    When will you run out of your medication:NEEDS FOR WEEKEND    Which Pharmacy are we sending the medication to?:  [address]

## 2021-01-29 NOTE — TELEPHONE ENCOUNTER
Received refill request for Metoprolol from [facility]. Last OV: 12/14/2020 w/ NPTS    Last Refill: 01/25/2021 #30 w/ 1 refill     Next App[facility]ment: [facility]20[facility]     Spoke with Maxime Cain ,pharmacy technician, and he stated script is ready as of Tuesday . Pt is aware at this time.  Call complete

## 2021-03-31 RX ORDER — METOPROLOL SUCCINATE 25 MG/1
TABLET, EXTENDED RELEASE ORAL
Qty: 30 TABLET | Refills: 0 | Status: SHIPPED | OUTPATIENT
Start: 2021-03-31 | End: 2021-07-27

## 2021-04-09 NOTE — PROGRESS NOTES
[address]   Cardiac Evaluation      Patient: Tabitha Rivas  YOB: 1945         Chief Complaint   Patient presents with    Hypertension     Patient return to office for her 4 month follow up     Hyperlipidemia    Coronary Artery Disease        Referring provider: Roxann Gruber MD    History of Present Illness:  Ms Ayla Mcdowell is a 66 y.0. female here for routine follow up for CAD, MR, Htn, Hld. In Oct '19 she presented to the ER with coffee ground emesis. She was also found to have elevated troponin and abnormal EKG. LHC revealed mild to moderate disease. By echo in Oct '20 EF is 60% with moderate MR. She also has a PMH of CKD and anemia. She is not dialysis dependent at this time, she is followed by Dr Luisa Elliott @ [address] [place] [address]. Today she reports that she is doing well. She is not having any chest pain or SOB. She does not go out much other than to the grocery store. She was seen by Telma JONES in October for an 'episode' that she describes as heartburn with vomiting. She has not had any other episodes since. She is still smoking about 3/4 ppd. She does not have the motivation to quit altogether at this time. With regard to medication therapy he/she has been compliant with prescribed regimen and has tolerated therapy to date. Past Medical History:   has a past medical history of CKD (chronic kidney disease), Hyperlipidemia, Hypertension, and Water retention. Surgical History:   has a past surgical history that includes Cholecystectomy; hernia repair; Hysterectomy; Breast surgery; and Upper gastrointestinal endoscopy (N/A, 10/7/2019).      Current Outpatient Medications   Medication Sig Dispense Refill    lisinopril (PRINIVIL;ZESTRIL) 20 MG tablet       metoprolol succinate (TOPROL XL) 25 MG extended release tablet TAKE ONE TABLET BY MOUTH DAILY 30 tablet 0    epoetin ernesto (PROCRIT) 18965 UNIT/ML injection Infuse 6,800 Units intravenously every 14 days If Hgb < 10      aspirin 81 MG chewable tablet Take 1 tablet by mouth daily 30 tablet 3    atorvastatin (LIPITOR) 40 MG tablet Take 1 tablet by mouth daily 30 tablet 0    amLODIPine (NORVASC) 10 MG tablet Take 1 tablet by mouth 2 times daily      ferrous sulfate 325 (65 Fe) MG tablet Take 1 tablet by mouth 3 times daily (with meals)       sodium bicarbonate 650 MG tablet Take 2 tablets by mouth 3 times daily      allopurinol (ZYLOPRIM) 100 MG tablet Take 1 tablet by mouth daily      Cholecalciferol (VITAMIN D3) 2000 UNITS CAPS Take 1 capsule by mouth daily       No current facility-administered medications for this visit. Allergies:  Patient has no known allergies.      Social History:  Social History     Socioeconomic History    Marital status: Single     Spouse name: Not on file    Number of children: Not on file    Years of education: Not on file    Highest education level: Not on file   Occupational History    Not on file   Social Needs    Financial resource strain: Not on file    Food insecurity     Worry: Not on file     Inability: Not on file    Transportation needs     Medical: Not on file     Non-medical: Not on file   Tobacco Use    Smoking status: Current Every Day Smoker     Packs/day: 0.75     Years: 60.00     Pack years: 45.00     Types: Cigarettes    Smokeless tobacco: Never Used   Substance and Sexual Activity    Alcohol use: Yes     Frequency: 4 or more times a week     Comment: socially    Drug use: No    Sexual activity: Not on file   Lifestyle    Physical activity     Days per week: Not on file     Minutes per session: Not on file    Stress: Not on file   Relationships    Social connections     Talks on phone: Not on file     Gets together: Not on file     Attends Mu-ism service: Not on file     Active member of club or organization: Not on file     Attends meetings of clubs or organizations: Not on file     Relationship status: Not on file    Intimate partner violence     Fear of current or ex partner: Not on file     Emotionally abused: Not on file     Physically abused: Not on file     Forced sexual activity: Not on file   Other Topics Concern    Not on file   Social History Narrative    Not on file       Family History:   History reviewed. No pertinent family history. Family history has been reviewed and not pertinent except as noted above. Review of Systems:   · Constitutional: there has been no unanticipated weight loss. No change in energy or activity level   · Eyes: No visual changes   · ENT: No Headaches, hearing loss or vertigo. No mouth sores or sore throat. · Cardiovascular: Reviewed in HPI  · Respiratory: No cough or wheezing, no sputum production. · Gastrointestinal: No abdominal pain, appetite loss, blood in stools. No change in bowel or bladder habits. · Genitourinary: No nocturia, dysuria, trouble voiding  · Musculoskeletal:  No gait disturbance, weakness or joint complaints. · Integumentary: No rash or pruritis. · Neurological: No headache, change in muscle strength, numbness or tingling. No change in gait, balance, coordination, mood, affect, memory, mentation, behavior. · Psychiatric: No anxiety or depression  · Endocrine: No malaise or fever  · Hematologic/Lymphatic: No abnormal bruising or bleeding, blood clots or swollen lymph nodes. · Allergic/Immunologic: No nasal congestion or hives. Physical Examination:    Vitals:    04/12/21 1300 04/12/21 1305   BP: (!) 140/60 130/60   Site: Right Upper Arm Right Upper Arm   Position: Sitting Sitting   Cuff Size: Medium Adult Medium Adult   Pulse: 50 51   Resp: 20 19   Weight: 150 lb 12.8 oz (68.4 kg)    Height: 5' 2.5\" (1.588 m)      Body mass index is 27.14 kg/m².      Wt Readings from Last 3 Encounters:   04/12/21 150 lb 12.8 oz (68.4 kg)   12/14/20 148 lb (67.1 kg)   10/27/20 150 lb (68 kg)      BP Readings from Last 3 Encounters:   04/12/21 130/60   12/14/20 134/62   10/27/20 (!) 132/52        Physical 2.5m  Access: R radial     Impression  ~Coronary Angiography w/ Mild to moderate disease  ~High LVEDP     Recommendation  ~Aggressive medical treatment and risk factor modification  ~1. Medications reviewed, no changes at this time. 2. Stop heparin gtt. No IVF. Bedrest.  3. No indication for plavix therapy. Cont aspirin and statin. 4. Patient has been advised on the importance of regular exercise of at least 20-30 minutes daily alternating with aerobic and isometric activities. 5. Patient counseled about and offered assistance for smoking cessation   6. No indication for cardiac rehab  7. Follow up in 2 months with cardiology   8. Give lasix 20mg IV. Assessment/Plan  1. Coronary artery disease involving native coronary artery of native heart without angina pectoris    2. Essential hypertension    3. Hyperlipidemia, unspecified hyperlipidemia type    4. Mitral valve insufficiency, unspecified etiology    5. ESRD (end stage renal disease) ([facility]    6. Encounter for smoking cessation counseling          Coronary artery disease involving native coronary artery of native heart without angina pectoris  Angina ~ none  CCS class 1  Intervention  [facility]HC~ '19 mild to moderate disease RCA/OM  Echo~ 60%  Current meds~ asa / Toprol  Plan~  Continue medications    Mitral valve insufficiency  Moderate on echo 12/2020    Essential hypertension  Controlled  Meds~ amlodipine  Plan~ no changes today      Hyperlipidemia  Jan '20      HDL 55      Meds~ lipitor  Plan~ managed by pcp    ESRD (end stage renal disease) ([facility]  No dialysis at this time. Follows Dr Iris Lobo at [place] [address] for smoking cessation counseling  Ready to quit: No  Counseling given: Yes       No orders of the defined types were placed in this encounter. Follow up in 6 mos with NPTS    Francisca Kulkarni MD      Thank you for allowing to me to participate in the care of Tanmay Mills. Scribe's Attestation:  This note was scribed in the presence of Dr. Linda Umana MD by Giselle Reyes RN.    I, Dr. Linda Umana, personally performed the services described in this documentation, as scribed by the above signed scribe in my presence. It is both accurate and complete to my knowledge. I agree with the details independently gathered by the clinical support staff, while the remaining scribed note accurately describes my personal service to the patient.

## 2021-04-12 ENCOUNTER — OFFICE VISIT (OUTPATIENT)
Dept: CARDIOLOGY CLINIC | Age: 76
End: 2021-04-12
Payer: MEDICARE

## 2021-04-12 VITALS
WEIGHT: 150.8 LBS | BODY MASS INDEX: 26.72 KG/M2 | RESPIRATION RATE: 19 BRPM | HEART RATE: 51 BPM | HEIGHT: 63 IN | SYSTOLIC BLOOD PRESSURE: 130 MMHG | DIASTOLIC BLOOD PRESSURE: 60 MMHG

## 2021-04-12 DIAGNOSIS — I34.0 MITRAL VALVE INSUFFICIENCY, UNSPECIFIED ETIOLOGY: ICD-10-CM

## 2021-04-12 DIAGNOSIS — Z71.6 ENCOUNTER FOR SMOKING CESSATION COUNSELING: ICD-10-CM

## 2021-04-12 DIAGNOSIS — I10 ESSENTIAL HYPERTENSION: ICD-10-CM

## 2021-04-12 DIAGNOSIS — I25.10 CORONARY ARTERY DISEASE INVOLVING NATIVE CORONARY ARTERY OF NATIVE HEART WITHOUT ANGINA PECTORIS: Primary | ICD-10-CM

## 2021-04-12 DIAGNOSIS — N18.6 ESRD (END STAGE RENAL DISEASE) (HCC): ICD-10-CM

## 2021-04-12 DIAGNOSIS — E78.5 HYPERLIPIDEMIA, UNSPECIFIED HYPERLIPIDEMIA TYPE: ICD-10-CM

## 2021-04-12 PROCEDURE — 99214 OFFICE O/P EST MOD 30 MIN: CPT | Performed by: INTERNAL MEDICINE

## 2021-04-12 RX ORDER — LISINOPRIL 20 MG/1
TABLET ORAL
Status: ON HOLD | COMMUNITY
Start: 2021-01-14 | End: 2021-07-09 | Stop reason: HOSPADM

## 2021-04-12 SDOH — HEALTH STABILITY: MENTAL HEALTH: HOW OFTEN DO YOU HAVE A DRINK CONTAINING ALCOHOL?: 4 OR MORE TIMES A WEEK

## 2021-04-12 NOTE — ASSESSMENT & PLAN NOTE
Angina ~ none  CCS class 1  Intervention  [facility]~ '19 mild to moderate disease RCA/OM  Echo~ 60%  Current meds~ asa / Toprol  Plan~  Continue medications

## 2021-04-12 NOTE — PATIENT INSTRUCTIONS
Keep trying on your smoking cessation  Continue your medications  Call the office if you have any concerns  Your heart monitor was normal  See Oliva Doshi in 6 mos

## 2021-07-08 ENCOUNTER — APPOINTMENT (OUTPATIENT)
Dept: GENERAL RADIOLOGY | Age: 76
DRG: 641 | End: 2021-07-08
Payer: MEDICARE

## 2021-07-08 ENCOUNTER — HOSPITAL ENCOUNTER (INPATIENT)
Age: 76
LOS: 1 days | Discharge: HOME | DRG: 641 | End: 2021-07-09
Attending: INTERNAL MEDICINE | Admitting: INTERNAL MEDICINE
Payer: MEDICARE

## 2021-07-08 DIAGNOSIS — N18.9 CHRONIC KIDNEY DISEASE, UNSPECIFIED CKD STAGE: ICD-10-CM

## 2021-07-08 DIAGNOSIS — E87.5 HYPERKALEMIA: Primary | ICD-10-CM

## 2021-07-08 LAB
A/G RATIO: 1.3 (ref 1.1–2.2)
ALBUMIN SERPL-MCNC: 4.1 G/DL (ref 3.4–5)
ALP BLD-CCNC: 116 U/L (ref 40–129)
ALT SERPL-CCNC: 7 U/L (ref 10–40)
ANION GAP SERPL CALCULATED.3IONS-SCNC: 11 MMOL/L (ref 3–16)
ANION GAP SERPL CALCULATED.3IONS-SCNC: 12 MMOL/L (ref 3–16)
AST SERPL-CCNC: 16 U/L (ref 15–37)
BASOPHILS ABSOLUTE: 0.1 K/UL (ref 0–0.2)
BASOPHILS RELATIVE PERCENT: 0.9 %
BILIRUB SERPL-MCNC: <0.2 MG/DL (ref 0–1)
BILIRUBIN URINE: NEGATIVE
BLOOD, URINE: NEGATIVE
BUN BLDV-MCNC: 42 MG/DL (ref 7–20)
BUN BLDV-MCNC: 42 MG/DL (ref 7–20)
CALCIUM SERPL-MCNC: 9 MG/DL (ref 8.3–10.6)
CALCIUM SERPL-MCNC: 9.3 MG/DL (ref 8.3–10.6)
CHLORIDE BLD-SCNC: 104 MMOL/L (ref 99–110)
CHLORIDE BLD-SCNC: 108 MMOL/L (ref 99–110)
CLARITY: CLEAR
CO2: 15 MMOL/L (ref 21–32)
CO2: 23 MMOL/L (ref 21–32)
COLOR: YELLOW
CREAT SERPL-MCNC: 2.7 MG/DL (ref 0.6–1.2)
CREAT SERPL-MCNC: 2.8 MG/DL (ref 0.6–1.2)
EKG ATRIAL RATE: 136 BPM
EKG DIAGNOSIS: NORMAL
EKG P AXIS: 53 DEGREES
EKG P-R INTERVAL: 170 MS
EKG Q-T INTERVAL: 204 MS
EKG QRS DURATION: 74 MS
EKG QTC CALCULATION (BAZETT): 307 MS
EKG R AXIS: 23 DEGREES
EKG T AXIS: 54 DEGREES
EKG VENTRICULAR RATE: 136 BPM
EOSINOPHILS ABSOLUTE: 0.3 K/UL (ref 0–0.6)
EOSINOPHILS RELATIVE PERCENT: 3.9 %
EPITHELIAL CELLS, UA: 0 /HPF (ref 0–5)
GFR AFRICAN AMERICAN: 20
GFR AFRICAN AMERICAN: 21
GFR NON-AFRICAN AMERICAN: 16
GFR NON-AFRICAN AMERICAN: 17
GLOBULIN: 3.2 G/DL
GLUCOSE BLD-MCNC: 103 MG/DL (ref 70–99)
GLUCOSE BLD-MCNC: 84 MG/DL (ref 70–99)
GLUCOSE BLD-MCNC: 87 MG/DL (ref 70–99)
GLUCOSE BLD-MCNC: 97 MG/DL (ref 70–99)
GLUCOSE URINE: NEGATIVE MG/DL
HCT VFR BLD CALC: 34.4 % (ref 36–48)
HEMOGLOBIN: 10.5 G/DL (ref 12–16)
HYALINE CASTS: 0 /LPF (ref 0–8)
KETONES, URINE: NEGATIVE MG/DL
LEUKOCYTE ESTERASE, URINE: NEGATIVE
LYMPHOCYTES ABSOLUTE: 2 K/UL (ref 1–5.1)
LYMPHOCYTES RELATIVE PERCENT: 23.3 %
MAGNESIUM: 1.8 MG/DL (ref 1.8–2.4)
MCH RBC QN AUTO: 28.9 PG (ref 26–34)
MCHC RBC AUTO-ENTMCNC: 30.4 G/DL (ref 31–36)
MCV RBC AUTO: 95 FL (ref 80–100)
MICROSCOPIC EXAMINATION: YES
MONOCYTES ABSOLUTE: 0.7 K/UL (ref 0–1.3)
MONOCYTES RELATIVE PERCENT: 8.4 %
NEUTROPHILS ABSOLUTE: 5.4 K/UL (ref 1.7–7.7)
NEUTROPHILS RELATIVE PERCENT: 63.5 %
NITRITE, URINE: NEGATIVE
PDW BLD-RTO: 17 % (ref 12.4–15.4)
PERFORMED ON: ABNORMAL
PERFORMED ON: NORMAL
PH UA: 7.5 (ref 5–8)
PLATELET # BLD: 296 K/UL (ref 135–450)
PMV BLD AUTO: 8.7 FL (ref 5–10.5)
POTASSIUM REFLEX MAGNESIUM: 5.4 MMOL/L (ref 3.5–5.1)
POTASSIUM SERPL-SCNC: 6.1 MMOL/L (ref 3.5–5.1)
POTASSIUM SERPL-SCNC: 7.2 MMOL/L (ref 3.5–5.1)
PROTEIN UA: 30 MG/DL
RBC # BLD: 3.62 M/UL (ref 4–5.2)
RBC UA: 0 /HPF (ref 0–4)
SODIUM BLD-SCNC: 135 MMOL/L (ref 136–145)
SODIUM BLD-SCNC: 138 MMOL/L (ref 136–145)
SPECIFIC GRAVITY UA: 1.01 (ref 1–1.03)
TOTAL CK: 64 U/L (ref 26–192)
TOTAL PROTEIN: 7.3 G/DL (ref 6.4–8.2)
URINE REFLEX TO CULTURE: ABNORMAL
URINE TYPE: ABNORMAL
UROBILINOGEN, URINE: 0.2 E.U./DL
WBC # BLD: 8.5 K/UL (ref 4–11)
WBC UA: 2 /HPF (ref 0–5)

## 2021-07-08 PROCEDURE — 85025 COMPLETE CBC W/AUTO DIFF WBC: CPT

## 2021-07-08 PROCEDURE — 99283 EMERGENCY DEPT VISIT LOW MDM: CPT

## 2021-07-08 PROCEDURE — 94640 AIRWAY INHALATION TREATMENT: CPT

## 2021-07-08 PROCEDURE — 96376 TX/PRO/DX INJ SAME DRUG ADON: CPT

## 2021-07-08 PROCEDURE — 6360000002 HC RX W HCPCS: Performed by: INTERNAL MEDICINE

## 2021-07-08 PROCEDURE — 93005 ELECTROCARDIOGRAM TRACING: CPT | Performed by: EMERGENCY MEDICINE

## 2021-07-08 PROCEDURE — 99223 1ST HOSP IP/OBS HIGH 75: CPT | Performed by: INTERNAL MEDICINE

## 2021-07-08 PROCEDURE — 6370000000 HC RX 637 (ALT 250 FOR IP): Performed by: STUDENT IN AN ORGANIZED HEALTH CARE EDUCATION/TRAINING PROGRAM

## 2021-07-08 PROCEDURE — 94761 N-INVAS EAR/PLS OXIMETRY MLT: CPT

## 2021-07-08 PROCEDURE — 2580000003 HC RX 258: Performed by: INTERNAL MEDICINE

## 2021-07-08 PROCEDURE — 6370000000 HC RX 637 (ALT 250 FOR IP): Performed by: PHYSICIAN ASSISTANT

## 2021-07-08 PROCEDURE — 93010 ELECTROCARDIOGRAM REPORT: CPT | Performed by: INTERNAL MEDICINE

## 2021-07-08 PROCEDURE — 96375 TX/PRO/DX INJ NEW DRUG ADDON: CPT

## 2021-07-08 PROCEDURE — 2500000003 HC RX 250 WO HCPCS: Performed by: PHYSICIAN ASSISTANT

## 2021-07-08 PROCEDURE — 6370000000 HC RX 637 (ALT 250 FOR IP): Performed by: INTERNAL MEDICINE

## 2021-07-08 PROCEDURE — 96374 THER/PROPH/DIAG INJ IV PUSH: CPT

## 2021-07-08 PROCEDURE — 2500000003 HC RX 250 WO HCPCS: Performed by: INTERNAL MEDICINE

## 2021-07-08 PROCEDURE — 36415 COLL VENOUS BLD VENIPUNCTURE: CPT

## 2021-07-08 PROCEDURE — 81001 URINALYSIS AUTO W/SCOPE: CPT

## 2021-07-08 PROCEDURE — 2000000000 HC ICU R&B

## 2021-07-08 PROCEDURE — 82550 ASSAY OF CK (CPK): CPT

## 2021-07-08 PROCEDURE — 83735 ASSAY OF MAGNESIUM: CPT

## 2021-07-08 PROCEDURE — 93005 ELECTROCARDIOGRAM TRACING: CPT | Performed by: PHYSICIAN ASSISTANT

## 2021-07-08 PROCEDURE — 84132 ASSAY OF SERUM POTASSIUM: CPT

## 2021-07-08 PROCEDURE — 96372 THER/PROPH/DIAG INJ SC/IM: CPT

## 2021-07-08 PROCEDURE — 80053 COMPREHEN METABOLIC PANEL: CPT

## 2021-07-08 PROCEDURE — 96365 THER/PROPH/DIAG IV INF INIT: CPT

## 2021-07-08 PROCEDURE — 71045 X-RAY EXAM CHEST 1 VIEW: CPT

## 2021-07-08 RX ORDER — NICOTINE POLACRILEX 4 MG
15 LOZENGE BUCCAL PRN
Status: DISCONTINUED | OUTPATIENT
Start: 2021-07-08 | End: 2021-07-09 | Stop reason: HOSPADM

## 2021-07-08 RX ORDER — DEXTROSE MONOHYDRATE 50 MG/ML
100 INJECTION, SOLUTION INTRAVENOUS PRN
Status: DISCONTINUED | OUTPATIENT
Start: 2021-07-08 | End: 2021-07-09 | Stop reason: HOSPADM

## 2021-07-08 RX ORDER — ASPIRIN 81 MG/1
81 TABLET, CHEWABLE ORAL DAILY
Status: DISCONTINUED | OUTPATIENT
Start: 2021-07-09 | End: 2021-07-09 | Stop reason: HOSPADM

## 2021-07-08 RX ORDER — ATORVASTATIN CALCIUM 40 MG/1
40 TABLET, FILM COATED ORAL DAILY
Status: DISCONTINUED | OUTPATIENT
Start: 2021-07-09 | End: 2021-07-09 | Stop reason: HOSPADM

## 2021-07-08 RX ORDER — DEXTROSE MONOHYDRATE 25 G/50ML
12.5 INJECTION, SOLUTION INTRAVENOUS PRN
Status: DISCONTINUED | OUTPATIENT
Start: 2021-07-08 | End: 2021-07-09 | Stop reason: HOSPADM

## 2021-07-08 RX ORDER — CALCIUM GLUCONATE 20 MG/ML
1000 INJECTION, SOLUTION INTRAVENOUS ONCE
Status: COMPLETED | OUTPATIENT
Start: 2021-07-08 | End: 2021-07-08

## 2021-07-08 RX ORDER — FUROSEMIDE 10 MG/ML
80 INJECTION INTRAMUSCULAR; INTRAVENOUS ONCE
Status: COMPLETED | OUTPATIENT
Start: 2021-07-08 | End: 2021-07-08

## 2021-07-08 RX ORDER — ACETAMINOPHEN 325 MG/1
650 TABLET ORAL EVERY 6 HOURS PRN
Status: DISCONTINUED | OUTPATIENT
Start: 2021-07-08 | End: 2021-07-09 | Stop reason: HOSPADM

## 2021-07-08 RX ORDER — METOPROLOL SUCCINATE 25 MG/1
25 TABLET, EXTENDED RELEASE ORAL DAILY
Status: DISCONTINUED | OUTPATIENT
Start: 2021-07-08 | End: 2021-07-09 | Stop reason: HOSPADM

## 2021-07-08 RX ORDER — SODIUM POLYSTYRENE SULFONATE 15 G/60ML
15 SUSPENSION ORAL; RECTAL ONCE
Status: DISCONTINUED | OUTPATIENT
Start: 2021-07-08 | End: 2021-07-08

## 2021-07-08 RX ORDER — SODIUM CHLORIDE 0.9 % (FLUSH) 0.9 %
5-40 SYRINGE (ML) INJECTION EVERY 12 HOURS SCHEDULED
Status: DISCONTINUED | OUTPATIENT
Start: 2021-07-08 | End: 2021-07-09 | Stop reason: HOSPADM

## 2021-07-08 RX ORDER — SODIUM CHLORIDE 9 MG/ML
25 INJECTION, SOLUTION INTRAVENOUS PRN
Status: DISCONTINUED | OUTPATIENT
Start: 2021-07-08 | End: 2021-07-09 | Stop reason: HOSPADM

## 2021-07-08 RX ORDER — CALCIUM GLUCONATE 94 MG/ML
1000 INJECTION, SOLUTION INTRAVENOUS ONCE
Status: DISCONTINUED | OUTPATIENT
Start: 2021-07-08 | End: 2021-07-08 | Stop reason: ALTCHOICE

## 2021-07-08 RX ORDER — HEPARIN SODIUM 5000 [USP'U]/ML
5000 INJECTION, SOLUTION INTRAVENOUS; SUBCUTANEOUS EVERY 8 HOURS SCHEDULED
Status: DISCONTINUED | OUTPATIENT
Start: 2021-07-08 | End: 2021-07-09 | Stop reason: HOSPADM

## 2021-07-08 RX ORDER — POLYETHYLENE GLYCOL 3350 17 G/17G
17 POWDER, FOR SOLUTION ORAL DAILY PRN
Status: DISCONTINUED | OUTPATIENT
Start: 2021-07-08 | End: 2021-07-09 | Stop reason: HOSPADM

## 2021-07-08 RX ORDER — ALLOPURINOL 100 MG/1
100 TABLET ORAL DAILY
Status: DISCONTINUED | OUTPATIENT
Start: 2021-07-09 | End: 2021-07-09 | Stop reason: HOSPADM

## 2021-07-08 RX ORDER — SODIUM CHLORIDE 0.9 % (FLUSH) 0.9 %
5-40 SYRINGE (ML) INJECTION PRN
Status: DISCONTINUED | OUTPATIENT
Start: 2021-07-08 | End: 2021-07-09 | Stop reason: HOSPADM

## 2021-07-08 RX ORDER — ACETAMINOPHEN 650 MG/1
650 SUPPOSITORY RECTAL EVERY 6 HOURS PRN
Status: DISCONTINUED | OUTPATIENT
Start: 2021-07-08 | End: 2021-07-09 | Stop reason: HOSPADM

## 2021-07-08 RX ORDER — DEXTROSE MONOHYDRATE 25 G/50ML
25 INJECTION, SOLUTION INTRAVENOUS ONCE
Status: COMPLETED | OUTPATIENT
Start: 2021-07-08 | End: 2021-07-08

## 2021-07-08 RX ADMIN — SODIUM BICARBONATE 50 MEQ: 84 INJECTION, SOLUTION INTRAVENOUS at 16:45

## 2021-07-08 RX ADMIN — CALCIUM GLUCONATE 1000 MG: 20 INJECTION, SOLUTION INTRAVENOUS at 16:13

## 2021-07-08 RX ADMIN — FUROSEMIDE 80 MG: 10 INJECTION, SOLUTION INTRAMUSCULAR; INTRAVENOUS at 16:17

## 2021-07-08 RX ADMIN — METOPROLOL SUCCINATE 25 MG: 25 TABLET, EXTENDED RELEASE ORAL at 21:12

## 2021-07-08 RX ADMIN — ALBUTEROL SULFATE 10 MG: 2.5 SOLUTION RESPIRATORY (INHALATION) at 19:39

## 2021-07-08 RX ADMIN — DEXTROSE MONOHYDRATE 25 G: 25 INJECTION, SOLUTION INTRAVENOUS at 15:45

## 2021-07-08 RX ADMIN — SODIUM BICARBONATE 50 MEQ: 84 INJECTION, SOLUTION INTRAVENOUS at 15:53

## 2021-07-08 RX ADMIN — INSULIN HUMAN 10 UNITS: 100 INJECTION, SOLUTION PARENTERAL at 15:47

## 2021-07-08 RX ADMIN — HEPARIN SODIUM 5000 UNITS: 5000 INJECTION INTRAVENOUS; SUBCUTANEOUS at 21:12

## 2021-07-08 RX ADMIN — SODIUM ZIRCONIUM CYCLOSILICATE 10 G: 10 POWDER, FOR SUSPENSION ORAL at 15:37

## 2021-07-08 RX ADMIN — Medication 10 ML: at 21:13

## 2021-07-08 RX ADMIN — SODIUM BICARBONATE: 84 INJECTION, SOLUTION INTRAVENOUS at 16:49

## 2021-07-08 ASSESSMENT — ENCOUNTER SYMPTOMS
ABDOMINAL PAIN: 0
CONSTIPATION: 0
SHORTNESS OF BREATH: 0
CHEST TIGHTNESS: 0
PHOTOPHOBIA: 0
COUGH: 0
NAUSEA: 0
DIARRHEA: 0
SINUS PAIN: 0
EYE PAIN: 0
EYE REDNESS: 0
BACK PAIN: 0
BLOOD IN STOOL: 0
VOMITING: 0
SORE THROAT: 0
RHINORRHEA: 0

## 2021-07-08 ASSESSMENT — PAIN SCALES - GENERAL: PAINLEVEL_OUTOF10: 0

## 2021-07-08 NOTE — ED PROVIDER NOTES
[facility]        Pt Name: Vinay Pena  MRN: [id number]  [place] 1945  Date of evaluation: 7/8/2021  Provider: Thalia Kruger PA-C  PCP: Tennille Rivero MD  Note Started: 4:05 PM EDT        I have seen and evaluated this patient with my supervising physician Maya Cedillo MD.    CHIEF COMPLAINT       Chief Complaint   Patient presents with    Abnormal Lab     had blood work performed this AM PCP stated to report to ER d/t elevated K+       HISTORY OF PRESENT ILLNESS   (Location, Timing/Onset, Context/Setting, Quality, Duration, Modifying Factors, Severity, Associated Signs and Symptoms)  Note limiting factors. Chief Complaint: Abnormal lab    Vinay Pena is a 64 y.o. female who presents to the emergency department today for evaluation for an abnormal laboratory test.  The patient states that she had routine lab work performed earlier today at an outside facility, and she states that she was called by her primary care physician, and was told to come to the emergency room because her potassium was elevated. The patient arrives to the ED she states that she is asymptomatic. She states that she has no chest pain. She has no shortness of breath. She has no abdominal pain. She has no nausea, vomiting or diarrhea. No fever chills per no cough or congestion. No urinary symptoms patient otherwise has no complaints at this time. She states that she does have chronic kidney disease and she is followed by Dr. Alivia Colon, nephrology. Nursing Notes were all reviewed and agreed with or any disagreements were addressed in the HPI. REVIEW OF SYSTEMS    (2-9 systems for level 4, 10 or more for level 5)     Review of Systems   Constitutional: Negative for activity change, appetite change, chills and fever. HENT: Negative for congestion and rhinorrhea. Respiratory: Negative for cough and shortness of breath. Cardiovascular: Negative for chest pain. Gastrointestinal: Negative for abdominal pain, diarrhea, nausea and vomiting. Genitourinary: Negative for difficulty urinating, dysuria and hematuria. Positives and Pertinent negatives as per HPI. Except as noted above in the ROS, all other systems were reviewed and negative. PAST MEDICAL HISTORY     Past Medical History:   Diagnosis Date    CKD (chronic kidney disease)     stage 3, follows with [facility]    Hyperlipidemia     Hypertension     Water retention     ankle swelling         SURGICAL HISTORY     Past Surgical History:   Procedure Laterality Date    BREAST SURGERY      biopsy    CHOLECYSTECTOMY      HERNIA REPAIR      HYSTERECTOMY      UPPER GASTROINTESTINAL ENDOSCOPY N/A 10/7/2019    EGD CONTROL HEMORRHAGE performed by Shanta Chavarria MD at [address]       Previous Medications    ALLOPURINOL (ZYLOPRIM) 100 MG TABLET    Take 1 tablet by mouth daily    AMLODIPINE (NORVASC) 10 MG TABLET    Take 1 tablet by mouth 2 times daily    ASPIRIN 81 MG CHEWABLE TABLET    Take 1 tablet by mouth daily    ATORVASTATIN (LIPITOR) 40 MG TABLET    Take 1 tablet by mouth daily    CHOLECALCIFEROL (VITAMIN D3) 2000 UNITS CAPS    Take 1 capsule by mouth daily    EPOETIN REMY (PROCRIT) 57564 UNIT/ML INJECTION    Infuse 6,800 Units intravenously every 14 days If Hgb < 10    FERROUS SULFATE 325 (65 FE) MG TABLET    Take 1 tablet by mouth 3 times daily (with meals)     LISINOPRIL (PRINIVIL;ZESTRIL) 20 MG TABLET        METOPROLOL SUCCINATE (TOPROL XL) 25 MG EXTENDED RELEASE TABLET    TAKE ONE TABLET BY MOUTH DAILY    SODIUM BICARBONATE 650 MG TABLET    Take 2 tablets by mouth 3 times daily         ALLERGIES     Patient has no known allergies. FAMILYHISTORY     History reviewed. No pertinent family history.        SOCIAL HISTORY       Social History     Tobacco Use    Smoking status: Current Every Day Smoker (*)     Hemoglobin 10.5 (*)     Hematocrit 34.4 (*)     MCHC 30.4 (*)     RDW 17.0 (*)     All other components within normal limits    Narrative:     Performed at:  [facility] [address], [place], [facility]   Phone [phone]   COMPREHENSIVE METABOLIC PANEL - Abnormal; Notable for the following components:    Sodium 135 (*)     Potassium 7.2 (*)     CO2 15 (*)     BUN 42 (*)     CREATININE 2.8 (*)     GFR Non- 16 (*)     GFR  20 (*)     ALT 7 (*)     All other components within normal limits    Narrative:     Jacquelin Gutierrez  [facility] tel. [phone],  Chemistry results called to and read back by RUTHY Boyce, 07/08/2021  14:48, by [facility]  Performed at:  [facility] [address], [place], [facility]   Phone [phone]   MAGNESIUM    Narrative:     Jacquelin Gutierrez  [facility] tel. [phone],  Chemistry results called to and read back by RUTHY Boyce, 07/08/2021  14:48, by [facility]  Performed at:  [facility] [address] [facility], [facility] [employer]   Phone [phone]   URINE RT REFLEX TO CULTURE   POTASSIUM   CK   POCT GLUCOSE    Narrative:     Performed at:  [facility] [address], [place], [address] [employer]   Phone [phone]   POCT GLUCOSE   POCT GLUCOSE   POCT GLUCOSE   POCT GLUCOSE   POCT GLUCOSE   POCT GLUCOSE       When ordered only abnormal lab results are displayed. All other labs were within normal range or not returned as of this dictation. EKG: When ordered, EKG's are interpreted by the Emergency Department Physician in the absence of a cardiologist.  Please see their note for interpretation of EKG.     RADIOLOGY:   Non-plain film images such as CT, Ultrasound and MRI are read by the radiologist. Plain radiographic images are visualized and preliminarily interpreted by the ED Provider with the below findings:        Interpretation per the Radiologist below, if available at the time of this note:    XR CHEST PORTABLE   Final Result   Stable cardiomegaly and mild pulmonary vascular congestion. XR CHEST PORTABLE    Result Date: 7/8/2021  EXAMINATION: ONE XRAY VIEW OF THE CHEST 7/8/2021 2:50 pm COMPARISON: Chest x-ray dated 10/13/2019 HISTORY: ORDERING SYSTEM PROVIDED HISTORY: SOB TECHNOLOGIST PROVIDED HISTORY: Reason for exam:->SOB Reason for Exam: Abnormal Lab (had blood work performed this AM PCP stated to report to ER d/t elevated K+) Acuity: Acute Type of Exam: Initial FINDINGS: HEART/MEDIASTINUM: The cardiac silhouette is enlarged, but stable. PLEURA/LUNGS: There is mild pulmonary vascular congestion. There are no focal consolidations or pleural effusions. There is no appreciable pneumothorax. BONES/SOFT TISSUE: No acute abnormality. Stable cardiomegaly and mild pulmonary vascular congestion. PROCEDURES   Unless otherwise noted below, none     Procedures    CRITICAL CARE TIME   Critical Care  There was a high probability of life-threatening clinical deterioration in the patient's condition requiring my urgent intervention. Total critical care time with the patient was 45 minutes excluding separately reportable procedures.   Critical care required due to patients hyperkalemia, requiring nephrology consultation, multiple medications given, admission to hospitalist    CONSULTS:  [address] and DIFFERENTIAL DIAGNOSIS/MDM:   Vitals:    Vitals:    07/08/21 1345 07/08/21 1346   BP: (!) 146/74    Pulse: 82    Resp: 18    Temp: 98.3 °F (36.8 °C)    TempSrc: Oral    SpO2: 98%    Weight:  150 lb 6.4 oz (68.2 kg)   Height:  5' 2\" (1.575 m)       Patient was given the following medications:  Medications   glucose (GLUTOSE) 40 % oral gel 15 g (has no administration in time range)   dextrose 50 % IV solution (has no administration in time range) glucagon (rDNA) injection 1 mg (has no administration in time range)   dextrose 5 % solution (has no administration in time range)   calcium gluconate 1000 mg in sodium chloride 50 mL (has no administration in time range)   furosemide (LASIX) injection 80 mg (has no administration in time range)   sodium bicarbonate 8.4 % injection 50 mEq (has no administration in time range)   sodium bicarbonate 150 mEq in dextrose 5 % 1,000 mL infusion (has no administration in time range)   insulin regular (HUMULIN R;NOVOLIN R) injection 10 Units (10 Units Intravenous Given 7/8/21 1547)     And   dextrose 50 % IV solution (25 g Intravenous Given 7/8/21 1545)   sodium bicarbonate 8.4 % injection 50 mEq (50 mEq Intravenous Given 7/8/21 1553)   sodium zirconium cyclosilicate (LOKELMA) oral suspension 10 g (10 g Oral Given 7/8/21 1537)           Briefly, this is a 42-year-old female who presents to the emergency department today for evaluation for an abnormal laboratory test.  The patient states that she was at an outside facility today, she states that she had routine lab work drawn, and was told to come to the emergency room. In review the patient's chart, potassium was 6.5 at that time. The patient denies any ED today she is asymptomatic. EKG as documented by my attending, please see his note for further details. CBC shows no evidence of leukocytosis, mild anemia. CMP shows a hyperkalemia of 7.2, slightly worsening renal function. I did discuss the case with nephrology, patient will be given Lokelma, insulin, bicarb as well as calcium. Her x-ray is unremarkable. Allergy states that they will add on Lasix as well as an additional amp of bicarb. Patient continues to remain asymptomatic with stable vital signs. She will need to be admitted for further care and evaluation, hospitalist is agreed for admission, patient is updated and agreeable with plan. Stable for admission    FINAL IMPRESSION      1.  Hyperkalemia 2. Chronic kidney disease, unspecified CKD stage          DISPOSITION/PLAN   DISPOSITION Admitted 07/08/2021 04:00:38 PM      PATIENT REFERRED TO:  No follow-up provider specified.     DISCHARGE MEDICATIONS:  New Prescriptions    No medications on file       DISCONTINUED MEDICATIONS:  Discontinued Medications    No medications on file              (Please note that portions of this note were completed with a voice recognition program.  Efforts were made to edit the dictations but occasionally words are mis-transcribed.)    Don Russo PA-C (electronically signed)            Don Russo PA-C  07/08/21 4405

## 2021-07-08 NOTE — PROGRESS NOTES
Pt admitted to room 3911 from ED. Pt Alert and oriented X4, VSS, on RA. Pt denies pain. Sodium Bicarbonate infusing @100ml/hr, ayala catheter patent,   Call light within reach, will continue to monitor. Critical result received, kcl 6.1, Message sent to Dr Arjun Burns and Dr Cecelia Watson. Will continue to monitor.

## 2021-07-08 NOTE — H&P
Hospital Medicine History & Physical      PCP: Vahid Aranda MD    Date of Admission: 7/8/2021    Date of Service: Pt seen/examined on 7/9/2021 and Admitted to Inpatient with expected LOS greater than two midnights due to medical therapy. Chief Complaint: Hyperkalemia      History Of Present Illness:  64 y.o. female with past medical history of CKD stage III, CAD, MR, hypertension, hyperlipidemia presented for evaluation of hyperkalemia. Patient had routine labs obtained earlier today and was told to come into the ED in view of potassium of 6.5. On arrival to [facility] ED patient has potassium 7.2. There is some tented T waves on the EKG. Patient given calcium gluconate insulin with glucose Lokelma and Lasix. Nephrology consulted from the ED. Patient states she is completely asymptomatic. Hospitalist consulted for admission. Past Medical History:          Diagnosis Date    CKD (chronic kidney disease)     stage 3, follows with [facility] HT[facility]    Hyperlipidemia     Hypertension     Water retention     ankle swelling       Past Surgical History:          Procedure Laterality Date    BREAST SURGERY      biopsy    CHOLECYSTECTOMY      HERNIA REPAIR      HYSTERECTOMY      UPPER GASTROINTESTINAL ENDOSCOPY N/A 10/7/2019    EGD CONTROL HEMORRHAGE performed by Crosby Ganser, MD at [address] [place] [address]       Medications Prior to Admission:      Prior to Admission medications    Medication Sig Start Date End Date Taking?  Authorizing Provider   metoprolol succinate (TOPROL XL) 25 MG extended release tablet TAKE ONE TABLET BY MOUTH DAILY 3/31/21  Yes BEATRIZ Bowman - CNP   epoetin ernesto (PROCRIT) 19911 UNIT/ML injection Infuse 6,800 Units intravenously every 14 days If Hgb < 10 7/1/20  Yes Historical Provider, MD   aspirin 81 MG chewable tablet Take 1 tablet by mouth daily 10/16/19  Yes Marcello Ward MD   atorvastatin (LIPITOR) 40 MG tablet Take 1 tablet by mouth daily respiratory effort. Clear to auscultation, bilaterally without Rales/Wheezes/Rhonchi. Cardiovascular:  Regular rate and rhythm with normal S1/S2 without murmurs, rubs or gallops. Abdomen: Soft, non-tender, non-distended with normal bowel sounds. Musculoskeletal:  No clubbing, cyanosis or edema bilaterally. Full range of motion without deformity. Skin: Skin color, texture, turgor normal.  No rashes or lesions. Neurologic:  Neurovascularly intact without any focal sensory/motor deficits. Cranial nerves: II-XII intact, grossly non-focal.  Psychiatric:  Alert and oriented, thought content appropriate, normal insight  Capillary Refill: Brisk,< 3 seconds   Peripheral Pulses: +2 palpable, equal bilaterally       Labs:     Recent Labs     07/08/21  1416   WBC 8.5   HGB 10.5*   HCT 34.4*        Recent Labs     07/08/21  1416   *   K 7.2*      CO2 15*   BUN 42*   CREATININE 2.8*   CALCIUM 9.0     Recent Labs     07/08/21  1416   AST 16   ALT 7*   BILITOT <0.2   ALKPHOS 116     No results for input(s): INR in the last 72 hours. No results for input(s): Facundo Beery in the last 72 hours. Urinalysis:      Lab Results   Component Value Date    NITRU Negative 10/06/2019    WBCUA 3 10/06/2019    BACTERIA 1+ 10/06/2019    RBCUA 3-5 10/06/2019    BLOODU LARGE 10/06/2019    SPECGRAV 1.017 10/06/2019    GLUCOSEU Negative 10/06/2019       Radiology:     CXR: I have reviewed the CXR with the following interpretation: No acute cardiopulmonary process  EKG:  I have reviewed the EKG with the following interpretation: Sinus rhythm with rate of 70 with tented T waves    XR CHEST PORTABLE   Final Result   Stable cardiomegaly and mild pulmonary vascular congestion.              ASSESSMENT:    Active Hospital Problems    Diagnosis Date Noted    Hyperkalemia [E87.5] 07/08/2021       PLAN:    Hyperkalemia  Severe  With EKG changes  Status post Lokelma Lasix calcium gluconate and insulin with dextrose  Admit to ICU  Telemetry monitoring  BMP every 4 hours  Hold lisinopril  Nephrology on case    CLAUDETTE on CKD?   Nephrology consulted      DVT Prophylaxis: Heparin  Diet: No diet orders on file  Code Status: Prior      Electronically signed by Shruti Montoya MD on 7/8/2021 at 4:16 PM

## 2021-07-08 NOTE — PROGRESS NOTES
Pt seen in  ED, admission completed. Pt is alert and oriented x 3. Pt lives at home alone, and is being admitted for hyperkalemia. Plan of care updated, all questions answered.

## 2021-07-08 NOTE — CONSULTS
[facility] Pulmonary and Critical Care   Consult Note      Reason for Consult: Acute hyperkalemia  Requesting Physician: Francisca Wayne    Subjective:   CHIEF COMPLAINT: Abnormal lab work     HPI: Patient noted to have hyperkalemia with potassium of 6.5 during routine lab work obtained by patient's nephrologist today-hence was sent over to ED for further evaluation. Blood work done here showed potassium of 7.2, creatinine 2.8. Patient denies any shortness of breath, chest pain, palpitations or leg edema. States that this is her third hospitalization over the years for hyperkalemia. Patient does not seem to be compliant with dietary needs for CKD/potassium management. Also on lisinopril which could be contributing to patient's hyperkalemia. History of CKD stage III/IV, baseline creatinine of around 2. History of CAD, medically managed. History of hypertension       The patient is a 64 y.o. female with significant past medical history of:      Diagnosis Date    CKD (chronic kidney disease)     stage 3, follows with [facility] HT[facility]    Hyperlipidemia     Hypertension     Water retention     ankle swelling        Past Surgical History:        Procedure Laterality Date    BREAST SURGERY      biopsy    CHOLECYSTECTOMY      HERNIA REPAIR      HYSTERECTOMY      UPPER GASTROINTESTINAL ENDOSCOPY N/A 10/7/2019    EGD CONTROL HEMORRHAGE performed by Leslye Conti MD at [address] [facility]     Current Medications:    Current Facility-Administered Medications: glucose (GLUTOSE) 40 % oral gel 15 g, 15 g, Oral, PRN  dextrose 50 % IV solution, 12.5 g, Intravenous, PRN  glucagon (rDNA) injection 1 mg, 1 mg, Intramuscular, PRN  dextrose 5 % solution, 100 mL/hr, Intravenous, PRN  sodium bicarbonate 150 mEq in dextrose 5 % 1,000 mL infusion, , Intravenous, Continuous    No Known Allergies    Social History:    TOBACCO:   reports that she has been smoking cigarettes. She has a 45.00 pack-year smoking history.  She has never used smokeless tobacco.  ETOH:   reports current alcohol use. Patient currently lives independently    Family History:   History reviewed. No pertinent family history. REVIEW OF SYSTEMS:    Constitutional: negative for fatigue, fevers, malaise and weight loss  Ears, nose, mouth, throat: negative for ear drainage, epistaxis, hoarseness, nasal congestion, sore throat and voice change  Respiratory: negative for shortness of breath, cough, phlegm or pleurisy  Cardiovascular: negative for chest pain, chest pressure/discomfort, irregular heart beat, lower extremity edema and palpitations  Gastrointestinal: negative for abdominal pain, constipation, diarrhea, jaundice, melena, odynophagia, reflux symptoms and vomiting  Hematologic/lymphatic: negative for bleeding, easy bruising, lymphadenopathy and petechiae  Musculoskeletal:negative for arthralgias, bone pain, muscle weakness, neck pain and stiff joints  Neurological: negative for dizziness, gait problems, headaches, seizures, speech problems, tremors and weakness  Behavioral/Psych: negative for anxiety, behavior problems, depression, fatigue and sleep disturbance  Endocrine: negative for diabetic symptoms including none, neuropathy, polyphagia, polyuria, polydipsia, vomiting and diarrhea and temperature intolerance  Allergic/Immunologic: negative for anaphylaxis, angioedema, hay fever and urticaria      Objective:     Patient Vitals for the past 8 hrs:   BP Temp Temp src Pulse Resp SpO2 Height Weight   07/08/21 1515 134/68 -- -- 80 17 98 % -- --   07/08/21 1346 -- -- -- -- -- -- 5' 2\" (1.575 m) 150 lb 6.4 oz (68.2 kg)   07/08/21 1345 (!) 146/74 98.3 °F (36.8 °C) Oral 82 18 98 % -- --     No intake/output data recorded. No intake/output data recorded.     Physical Exam:  General Appearance: alert and oriented to person, place and time, well developed and well- nourished, in no acute distress  Skin: warm and dry, no rash or erythema  Head: normocephalic and BONES/SOFT TISSUE: No acute abnormality.           Impression   Stable cardiomegaly and mild pulmonary vascular congestion. Problem List:   Acute hyperkalemia  CKD stage III/IV    Assessment/Plan:     Patient is currently relatively asymptomatic, severe hyperkalemia noted with potassium of 7.2. Patient has been administered insulin/D50, Lokelma, calcium gluconate and one amp of sodium bicarbonate. Currently been started on sodium bicarbonate drip. Patient also noted to have acidosis from renal failure with bicarbonate of 15. Already seen by nephrology. Patient has also been administered Lasix 80 mg IV x1    Plans to repeat potassium-needs to be closely monitored. Place Gray catheter and monitor urine output. No symptoms or signs suggestive of volume overload on examination. Peaked T waves noted on EKG, rate 60 to 80/min. Patient would not require urgent hemodialysis if repeat potassium shows a trend towards improvement. Patient will require dietary advise for prevention of hyperkalemia. Critical care team will follow.     Blair Albright MD

## 2021-07-08 NOTE — ED NOTES
IV to left upper arm placed with ultra sound guidance. Dr. Nalini Johnson at bedside and made aware of infiltration to right upper arm IV access with sodium bicarb.       Andrez Alvarez RN  07/08/21 1486 [address]  07/08/21 9432

## 2021-07-08 NOTE — CONSULTS
Nephrology Consult Note  [phone]   ://[facility].        Reason for Consult : Hyperkalemia    HISTORY OF PRESENT ILLNESS:                The patient is a 64 y. 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 Otherwise, no complaints  Her out-pt labs revealed a potassium of 6.5, labs in the ER revealed a K of 7.2, received Insulin/D50, Lokelma, Calcium gluconate and sodium bicarb. Pt was seen and examined in the ER  Denies chest pains or sob  No nausea/emesis/diarrhea  No fevers/chills  Denies taking potassium supplements/salt substitutes  On lisinopril though    Past Medical History       Diagnosis Date    CKD (chronic kidney disease)     stage 3, follows with kidney and HTN center    Hyperlipidemia     Hypertension     Water retention     ankle swelling       Past Surgical History      Procedure Laterality Date    BREAST SURGERY      biopsy    CHOLECYSTECTOMY      HERNIA REPAIR      HYSTERECTOMY      UPPER GASTROINTESTINAL ENDOSCOPY N/A 10/7/2019    EGD CONTROL HEMORRHAGE performed by Too Jackson MD at [address]         Current Medications  No current facility-administered medications on file prior to encounter.      Current Outpatient Medications on File Prior to Encounter   Medication Sig Dispense Refill    metoprolol succinate (TOPROL XL) 25 MG extended release tablet TAKE ONE TABLET BY MOUTH DAILY 30 tablet 0    epoetin ernesto (PROCRIT) 43635 UNIT/ML injection Infuse 6,800 Units intravenously every 14 days If Hgb < 10      aspirin 81 MG chewable tablet Take 1 tablet by mouth daily 30 tablet 3    atorvastatin (LIPITOR) 40 MG tablet Take 1 tablet by mouth daily 30 tablet 0    amLODIPine (NORVASC) 10 MG tablet Take 1 tablet by mouth 2 times daily      ferrous sulfate 325 (65 Fe) MG tablet Take 1 tablet by mouth 3 times daily (with meals)       sodium bicarbonate 650 MG tablet Take 2 tablets by mouth 3 times daily      allopurinol (ZYLOPRIM) 100 MG tablet Take 1 tablet by mouth daily      Cholecalciferol (VITAMIN D3) 2000 UNITS CAPS Take 1 capsule by mouth daily      lisinopril (PRINIVIL;ZESTRIL) 20 MG tablet          Allergies:  Patient has no known allergies. Social History:    Social History     Socioeconomic History    Marital status: Single     Spouse name: Not on file    Number of children: Not on file    Years of education: Not on file    Highest education level: Not on file   Occupational History    Not on file   Tobacco Use    Smoking status: Current Every Day Smoker     Packs/day: 0.75     Years: 60.00     Pack years: 45.00     Types: Cigarettes    Smokeless tobacco: Never Used   Vaping Use    Vaping Use: Never used    Passive vaping exposure Yes   Substance and Sexual Activity    Alcohol use: Yes     Comment: socially    Drug use: No    Sexual activity: Not on file   Other Topics Concern    Not on file   Social History Narrative    Not on file     Social Determinants of Health     Financial Resource Strain:     Difficulty of Paying Living Expenses:    Food Insecurity:     Worried About Running Out of Food in the Last Year:     Ran Out of Food in the Last Year:    Transportation Needs:     Lack of Transportation (Medical):      Lack of Transportation (Non-Medical):    Physical Activity:     Days of Exercise per Week:     Minutes of Exercise per Session:    Stress:     Feeling of Stress :    Social Connections:     Frequency of Communication with Friends and Family:     Frequency of Social Gatherings with Friends and Family:     Attends Synagogue Services:     Active Member of Clubs or Organizations:     Attends Club or Organization Meetings:     Marital Status:    Intimate Partner Violence:     Fear of Current or Ex-Partner:     Emotionally Abused:     Physically Abused:     Sexually Abused:        Family History:   History reviewed. No pertinent family history. Review of Systems   Constitutional: Negative for activity change, appetite change, chills, fatigue and fever. HENT: Negative for ear discharge, ear pain, rhinorrhea, sinus pain and sore throat. Eyes: Negative for photophobia, pain and redness. Respiratory: Negative for cough, chest tightness and shortness of breath. Cardiovascular: Negative for chest pain and leg swelling. Gastrointestinal: Negative for abdominal pain, blood in stool, constipation, diarrhea and nausea. Endocrine: Negative for polydipsia, polyphagia and polyuria. Genitourinary: Negative for dysuria, frequency, hematuria and urgency. Musculoskeletal: Negative for arthralgias, back pain and myalgias. Skin: Negative for pallor and rash. Neurological: Negative for dizziness, tremors, seizures and headaches. Psychiatric/Behavioral: Negative for confusion and hallucinations. PHYSICAL EXAM:      Vitals:    BP (!) 146/74   Pulse 82   Temp 98.3 °F (36.8 °C) (Oral)   Resp 18   Ht 5' 2\" (1.575 m)   Wt 150 lb 6.4 oz (68.2 kg)   SpO2 98%   BMI 27.51 kg/m²   No intake/output data recorded. No intake/output data recorded. Physical Exam:  General : AAOx3, not in pain or respiratory distress, resting in bed  HEENT : normocephalic, atraumatic, mucosa moist, no palor or icterus  CVS: S1 S2 normal, regular rhythm, no murmurs or rubs. Lungs: Clear, no wheezing or crackles. Abd: Soft, bowel sounds normal, non-tender. Ext: No edema, no cyanosis  Skin: Warm. No rashes appreciated. : bladder non-distended, no tenderness over the bladder, ayala with clear urine  Neuro: Alert and oriented x 3, nonfocal.   Joints: No erythema noted over joints.       DATA:    CBC with Differential:    Lab Results   Component Value Date    WBC 8.5 07/08/2021    RBC 3.62 07/08/2021    HGB 10.5 07/08/2021    HCT 34.4 07/08/2021     07/08/2021    MCV 95.0 07/08/2021    MCH 28.9 07/08/2021    MCHC 30.4 07/08/2021    RDW 17.0 07/08/2021    LYMPHOPCT 23.3 07/08/2021    MONOPCT 8.4 07/08/2021    BASOPCT 0.9 07/08/2021    MONOSABS 0.7 07/08/2021    LYMPHSABS 2.0 07/08/2021    EOSABS 0.3 07/08/2021    BASOSABS 0.1 07/08/2021     BMP:    Lab Results   Component Value Date     07/08/2021    K 7.2 07/08/2021    K 3.5 10/07/2019     07/08/2021    CO2 15 07/08/2021    BUN 42 07/08/2021    LABALBU 4.1 07/08/2021    CREATININE 2.8 07/08/2021    CALCIUM 9.0 07/08/2021    GFRAA 20 07/08/2021    LABGLOM 16 07/08/2021    GLUCOSE 87 07/08/2021     Ionized Calcium:  No results found for: IONCA  Magnesium:    Lab Results   Component Value Date    MG 1.80 07/08/2021     Phosphorus:    Lab Results   Component Value Date    PHOS 4.7 10/15/2019     Troponin:    Lab Results   Component Value Date    TROPONINI 0.59 10/06/2019     Last 3 Troponin:    Lab Results   Component Value Date    TROPONINI 0.59 10/06/2019    TROPONINI 0.62 10/05/2019    TROPONINI 0.73 10/05/2019     U/A:    Lab Results   Component Value Date    COLORU YELLOW 10/06/2019    PROTEINU 100 10/06/2019    PHUR 5.0 10/06/2019    PHUR 5.0 10/06/2019    WBCUA 3 10/06/2019    RBCUA 3-5 10/06/2019    BACTERIA 1+ 10/06/2019    CLARITYU CLOUDY 10/06/2019    SPECGRAV 1.017 10/06/2019    LEUKOCYTESUR Negative 10/06/2019    UROBILINOGEN 0.2 10/06/2019    BILIRUBINUR SMALL 10/06/2019    BLOODU LARGE 10/06/2019    GLUCOSEU Negative 10/06/2019       ASSESSMENT/PLAN:      1. Hyperkalemia Life-threatening  In the setting of CKD and lisinopril use/acidosis  Discussed with ER, received meds including Lasix and lokelma  Repeat K pending  Hopeful to improve  If no improvement, may need dialysis  Discussed with pt, agreeable to dialysis if and when needed  May improve with diuretic and correction of acidosis  DO NOT RESUME LISINOPRIL ON DISCHARGE  2.  Acidosis metabolic  Given 2 amps of sodium bicarb  Start sodium bicarb gtt  3. Hypertension controlled  4. CKD IV  Creatinine stable. Total critical care time spent approx 35 mins from 5 PM to 535 PM    Thank you for allowing me to participate in the care of this patient. I will continue to follow along. Please call with questions.     Armand Randolph MD, MD.  Office Phone : [phone]  7/8/2021

## 2021-07-09 VITALS
DIASTOLIC BLOOD PRESSURE: 45 MMHG | OXYGEN SATURATION: 98 % | BODY MASS INDEX: 26.45 KG/M2 | HEIGHT: 62 IN | SYSTOLIC BLOOD PRESSURE: 124 MMHG | RESPIRATION RATE: 16 BRPM | HEART RATE: 97 BPM | WEIGHT: 143.74 LBS | TEMPERATURE: 97.5 F

## 2021-07-09 LAB
ANION GAP SERPL CALCULATED.3IONS-SCNC: 10 MMOL/L (ref 3–16)
ANION GAP SERPL CALCULATED.3IONS-SCNC: 11 MMOL/L (ref 3–16)
ANION GAP SERPL CALCULATED.3IONS-SCNC: 13 MMOL/L (ref 3–16)
ANION GAP SERPL CALCULATED.3IONS-SCNC: 9 MMOL/L (ref 3–16)
BASE EXCESS ARTERIAL: 7.6 MMOL/L (ref -3–3)
BUN BLDV-MCNC: 40 MG/DL (ref 7–20)
BUN BLDV-MCNC: 43 MG/DL (ref 7–20)
BUN BLDV-MCNC: 43 MG/DL (ref 7–20)
BUN BLDV-MCNC: 45 MG/DL (ref 7–20)
CALCIUM SERPL-MCNC: 8.2 MG/DL (ref 8.3–10.6)
CALCIUM SERPL-MCNC: 8.4 MG/DL (ref 8.3–10.6)
CALCIUM SERPL-MCNC: 8.6 MG/DL (ref 8.3–10.6)
CALCIUM SERPL-MCNC: 8.9 MG/DL (ref 8.3–10.6)
CARBOXYHEMOGLOBIN ARTERIAL: 1.6 % (ref 0–1.5)
CHLORIDE BLD-SCNC: 102 MMOL/L (ref 99–110)
CHLORIDE BLD-SCNC: 104 MMOL/L (ref 99–110)
CHLORIDE BLD-SCNC: 105 MMOL/L (ref 99–110)
CHLORIDE BLD-SCNC: 99 MMOL/L (ref 99–110)
CO2: 21 MMOL/L (ref 21–32)
CO2: 29 MMOL/L (ref 21–32)
CO2: 29 MMOL/L (ref 21–32)
CO2: 31 MMOL/L (ref 21–32)
CREAT SERPL-MCNC: 2.7 MG/DL (ref 0.6–1.2)
CREAT SERPL-MCNC: 2.7 MG/DL (ref 0.6–1.2)
CREAT SERPL-MCNC: 3 MG/DL (ref 0.6–1.2)
CREAT SERPL-MCNC: 3 MG/DL (ref 0.6–1.2)
EKG ATRIAL RATE: 72 BPM
EKG DIAGNOSIS: NORMAL
EKG P AXIS: 60 DEGREES
EKG P-R INTERVAL: 172 MS
EKG Q-T INTERVAL: 380 MS
EKG QRS DURATION: 64 MS
EKG QTC CALCULATION (BAZETT): 580 MS
EKG R AXIS: 23 DEGREES
EKG T AXIS: 46 DEGREES
EKG VENTRICULAR RATE: 140 BPM
GFR AFRICAN AMERICAN: 18
GFR AFRICAN AMERICAN: 18
GFR AFRICAN AMERICAN: 21
GFR AFRICAN AMERICAN: 21
GFR NON-AFRICAN AMERICAN: 15
GFR NON-AFRICAN AMERICAN: 15
GFR NON-AFRICAN AMERICAN: 17
GFR NON-AFRICAN AMERICAN: 17
GLUCOSE BLD-MCNC: 100 MG/DL (ref 70–99)
GLUCOSE BLD-MCNC: 104 MG/DL (ref 70–99)
GLUCOSE BLD-MCNC: 86 MG/DL (ref 70–99)
GLUCOSE BLD-MCNC: 92 MG/DL (ref 70–99)
HCO3 ARTERIAL: 32 MMOL/L (ref 21–29)
HEMOGLOBIN, ART, EXTENDED: 9.6 G/DL (ref 12–16)
METHEMOGLOBIN ARTERIAL: 0.1 %
O2 SAT, ARTERIAL: 92.2 %
O2 THERAPY: ABNORMAL
PCO2 ARTERIAL: 43.7 MMHG (ref 35–45)
PH ARTERIAL: 7.47 (ref 7.35–7.45)
PHOSPHORUS: 4.4 MG/DL (ref 2.5–4.9)
PO2 ARTERIAL: 60 MMHG (ref 75–108)
POTASSIUM REFLEX MAGNESIUM: 4.5 MMOL/L (ref 3.5–5.1)
POTASSIUM REFLEX MAGNESIUM: 4.6 MMOL/L (ref 3.5–5.1)
POTASSIUM REFLEX MAGNESIUM: 4.7 MMOL/L (ref 3.5–5.1)
POTASSIUM REFLEX MAGNESIUM: 4.9 MMOL/L (ref 3.5–5.1)
SODIUM BLD-SCNC: 139 MMOL/L (ref 136–145)
SODIUM BLD-SCNC: 139 MMOL/L (ref 136–145)
SODIUM BLD-SCNC: 141 MMOL/L (ref 136–145)
SODIUM BLD-SCNC: 144 MMOL/L (ref 136–145)
TCO2 ARTERIAL: 74.7 MMOL/L

## 2021-07-09 PROCEDURE — 84100 ASSAY OF PHOSPHORUS: CPT

## 2021-07-09 PROCEDURE — 6360000002 HC RX W HCPCS: Performed by: INTERNAL MEDICINE

## 2021-07-09 PROCEDURE — 93010 ELECTROCARDIOGRAM REPORT: CPT | Performed by: INTERNAL MEDICINE

## 2021-07-09 PROCEDURE — 82803 BLOOD GASES ANY COMBINATION: CPT

## 2021-07-09 PROCEDURE — 6370000000 HC RX 637 (ALT 250 FOR IP): Performed by: NURSE PRACTITIONER

## 2021-07-09 PROCEDURE — 96372 THER/PROPH/DIAG INJ SC/IM: CPT

## 2021-07-09 PROCEDURE — 2580000003 HC RX 258: Performed by: INTERNAL MEDICINE

## 2021-07-09 PROCEDURE — 99232 SBSQ HOSP IP/OBS MODERATE 35: CPT | Performed by: INTERNAL MEDICINE

## 2021-07-09 PROCEDURE — 6370000000 HC RX 637 (ALT 250 FOR IP): Performed by: INTERNAL MEDICINE

## 2021-07-09 PROCEDURE — 36415 COLL VENOUS BLD VENIPUNCTURE: CPT

## 2021-07-09 PROCEDURE — 2500000003 HC RX 250 WO HCPCS: Performed by: INTERNAL MEDICINE

## 2021-07-09 PROCEDURE — 80048 BASIC METABOLIC PNL TOTAL CA: CPT

## 2021-07-09 PROCEDURE — 96376 TX/PRO/DX INJ SAME DRUG ADON: CPT

## 2021-07-09 RX ORDER — VITAMIN B COMPLEX
2000 TABLET ORAL DAILY
Status: DISCONTINUED | OUTPATIENT
Start: 2021-07-09 | End: 2021-07-09 | Stop reason: HOSPADM

## 2021-07-09 RX ORDER — FERROUS SULFATE 325(65) MG
325 TABLET ORAL
Status: DISCONTINUED | OUTPATIENT
Start: 2021-07-09 | End: 2021-07-09 | Stop reason: HOSPADM

## 2021-07-09 RX ADMIN — FERROUS SULFATE TAB 325 MG (65 MG ELEMENTAL FE) 325 MG: 325 (65 FE) TAB at 11:21

## 2021-07-09 RX ADMIN — HEPARIN SODIUM 5000 UNITS: 5000 INJECTION INTRAVENOUS; SUBCUTANEOUS at 06:23

## 2021-07-09 RX ADMIN — METOPROLOL SUCCINATE 25 MG: 25 TABLET, EXTENDED RELEASE ORAL at 08:57

## 2021-07-09 RX ADMIN — ALLOPURINOL 100 MG: 100 TABLET ORAL at 08:56

## 2021-07-09 RX ADMIN — Medication 2000 UNITS: at 11:20

## 2021-07-09 RX ADMIN — SODIUM BICARBONATE: 84 INJECTION, SOLUTION INTRAVENOUS at 04:51

## 2021-07-09 RX ADMIN — ATORVASTATIN CALCIUM 40 MG: 40 TABLET, FILM COATED ORAL at 08:56

## 2021-07-09 RX ADMIN — ASPIRIN 81 MG: 81 TABLET, CHEWABLE ORAL at 08:56

## 2021-07-09 ASSESSMENT — PAIN SCALES - GENERAL
PAINLEVEL_OUTOF10: 0

## 2021-07-09 NOTE — PROGRESS NOTES
Pt ayala d/cd, pt voids  250ML     Pt d/c per order, discharge instruction reviewed with pt, all questions answered.  This Rn to transport pt to private care, will continue to [employer] [facility]

## 2021-07-09 NOTE — PROGRESS NOTES
Pt shift assessment completed see flowsheet for full details. Pt alert and oriented, VSS, denies pain and nausea at this time, a.m med given, call light within reach, will continue to monitor.

## 2021-07-09 NOTE — PROGRESS NOTES
Nephrology Progress Note  [phone]   [facility]    Patient:  Amanda Nelson   : 1945        HPI: The patient is a 64 y. o.female with significant past medical history of CKD III secondary to membranous nephropathy biopsy proven in , Proteinuria, , Hypertension, Hyperlipidemia, h/o Hysterectomy, h/o Cholecystectomy was sent in for abnormal labs with hyperkalemia. Otherwise, no complaints  Her out-pt labs revealed a potassium of 6.5, labs in the ER revealed a K of 7.2, received Insulin/D50, Lokelma, Calcium gluconate and sodium bicarb. Subjective:  -pt seen and examined  -PMSHx and meds reviewed  -family at bedside      Feels better, K is better    ROS:   Neg chest pain SOB        Meds:  Reviewed     Vitals:  BP (!) 129/51   Pulse 89   Temp 97.9 °F (36.6 °C) (Temporal)   Resp 14   Ht 5' 2\" (1.575 m)   Wt 143 lb 11.8 oz (65.2 kg)   SpO2 91%   BMI 26.29 kg/m²     Physical Exam:  General : AAOx3, not in pain or respiratory distress, resting in bed  HEENT : normocephalic, atraumatic, mucosa moist, no palor or icterus  CVS: S1 S2 normal, regular rhythm, no murmurs or rubs. Lungs: Clear, no wheezing or crackles. Abd: Soft, bowel sounds normal, non-tender. Ext: No edema, no cyanosis  Skin: Warm. No rashes appreciated. : bladder non-distended, no tenderness over the bladder, ayala with clear urine  Neuro: Alert and oriented x 3, nonfocal.   Joints: No erythema noted over joints.     Labs:  CBC:   Lab Results   Component Value Date    WBC 8.5 2021    RBC 3.62 2021    HGB 10.5 2021    HCT 34.4 2021    MCV 95.0 2021    MCH 28.9 2021    MCHC 30.4 2021    RDW 17.0 2021     2021    MPV 8.7 2021     BMP:    Lab Results   Component Value Date     2021    K 4.6 2021     2021    CO2 29 2021    BUN 40 2021    LABALBU 4.1 2021    CREATININE 2.7 2021    CALCIUM 8.4 07/09/2021    GFRAA 21 07/09/2021    LABGLOM 17 07/09/2021    GLUCOSE 100 07/09/2021       Assessment/Plan:    Hyperkalemia: in the setting of CKD/ACEI/acidosis  -resolved with medical treatment  -no HD needed  -d/c IV sodium bicarbonate    MA: due to CLAUDETTE on CKD-resolved with IV bicarbonate-lower rate    CKD stage 4: sees Dr. Quyen Kaminski to membranous nephropathy biopsy proven in 2008  -baseline SCr mid to high 2 range  -Cr is at baseline  -will need KRT in future    HTN: stable on metoprolol    Anemia: no JACQUELINE needed    CKD-MBD: check phos        Ayad Quintana MD

## 2021-07-09 NOTE — FLOWSHEET NOTE
4 Eyes Skin Assessment     NAME:  Chaz Chamorro  YOB: 1945  MEDICAL RECORD NUMBER:  [id number]    The patient is being assess for  Admission    I agree that 2 RN's have performed a thorough Head to Toe Skin Assessment on the patient. ALL assessment sites listed below have been assessed. Areas assessed by both nurses:    Head, Face, Ears, Shoulders, Back, Chest, Arms, Elbows, Hands, Sacrum. Buttock, Coccyx, Ischium and Legs. Feet and Heels        Does the Patient have a Wound?  No noted wound(s)       Anthony Prevention initiated:  NA   Wound Care Orders initiated:  NA    Pressure Injury (Stage 3,4, Unstageable, DTI, NWPT, and Complex wounds) if present place consult order under [de-identified] No    New and Established Ostomies if present place consult order under : NA      Nurse 1 eSignature: Electronically signed by Francis Grayson RN on 7/9/21 at 12:32 AM EDT    **SHARE this note so that the co-signing nurse is able to place an eSignature**    Nurse 2 eSignature: Electronically signed by Garth Baez RN on 7/9/21 at 12:43 AM EDT

## 2021-07-09 NOTE — PLAN OF CARE
Problem: Falls - Risk of:  Goal: Will remain free from falls  Description: Will remain free from falls  7/9/2021 0753 by Dejuan Scruggs RN  Outcome: Ongoing    Problem: Infection:  Goal: Will remain free from infection  Description: Will remain free from infection  7/9/2021 0753 by Dejuan Scruggs RN  Outcome: Ongoing    Problem: Safety:  Goal: Free from accidental physical injury  Description: Free from accidental physical injury    Problem: Skin Integrity:  Goal: Skin integrity will stabilize  Description: Skin integrity will stabilize  7/9/2021 0753 by Dejuan Scruggs RN  Outcome: Ongoing

## 2021-07-09 NOTE — PROGRESS NOTES
[facility] Pulmonary/CCM Progress note      Admit Date: 7/8/2021    Chief Complaint: Abnormal lab work, hyperkalemia    Subjective: Interval History: Hyperkalemia resolved with treatment-continues on IV sodium bicarbonate drip. Denies any shortness of breath or chest pain.     Scheduled Meds:   Vitamin D  2,000 Units Oral Daily    ferrous sulfate  325 mg Oral TID WC    allopurinol  100 mg Oral Daily    aspirin  81 mg Oral Daily    atorvastatin  40 mg Oral Daily    metoprolol succinate  25 mg Oral Daily    sodium chloride flush  5-40 mL Intravenous 2 times per day    heparin (porcine)  5,000 Units Subcutaneous 3 times per day     Continuous Infusions:   dextrose      sodium chloride       PRN Meds:glucose, dextrose, glucagon (rDNA), dextrose, sodium chloride flush, sodium chloride, polyethylene glycol, acetaminophen **OR** acetaminophen    Review of Systems  Constitutional: negative for fatigue, fevers, malaise and weight loss  Ears, nose, mouth, throat: negative for ear drainage, epistaxis, hoarseness, nasal congestion, sore throat and voice change  Respiratory: negative for shortness of breath, cough, phlegm or pleurisy  Cardiovascular: negative for chest pain, chest pressure/discomfort, irregular heart beat, lower extremity edema and palpitations  Gastrointestinal: negative for abdominal pain, constipation, diarrhea, jaundice, melena, odynophagia, reflux symptoms and vomiting  Hematologic/lymphatic: negative for bleeding, easy bruising, lymphadenopathy and petechiae  Musculoskeletal:negative for arthralgias, bone pain, muscle weakness, neck pain and stiff joints  Neurological: negative for dizziness, gait problems, headaches, seizures, speech problems, tremors and weakness  Behavioral/Psych: negative for anxiety, behavior problems, depression, fatigue and sleep disturbance  Endocrine: negative for diabetic symptoms including none, neuropathy, polyphagia, polyuria, polydipsia, vomiting and diarrhea and temperature intolerance  Allergic/Immunologic: negative for anaphylaxis, angioedema, hay fever and urticaria    Objective:     Patient Vitals for the past 8 hrs:   BP Temp Temp src   07/09/21 1200 (!) 117/59 97.5 °F (36.4 °C) Temporal   07/09/21 0800 -- 97.9 °F (36.6 °C) Temporal     I/O last 3 completed shifts: In: 5409 [P.O.:360; I.V.:1277; IV Piggyback:50]  Out: 4058 [Urine:3125]  No intake/output data recorded.     General Appearance: alert and oriented to person, place and time, well developed and well- nourished, in no acute distress  Skin: warm and dry, no rash or erythema  Head: normocephalic and atraumatic  Eyes: pupils equal, round, and reactive to light, extraocular eye movements intact, conjunctivae normal  ENT: external ear and ear canal normal bilaterally, nose without deformity, nasal mucosa and turbinates normal  Neck: supple and non-tender without mass, no cervical lymphadenopathy  Pulmonary/Chest: clear to auscultation bilaterally- no wheezes, rales or rhonchi, normal air movement, no respiratory distress  Cardiovascular: normal rate, regular rhythm,  no murmurs, rubs, distal pulses intact, no carotid bruits  Abdomen: soft, non-tender, non-distended, normal bowel sounds, no masses or organomegaly  Lymph Nodes: Cervical, supraclavicular normal  Extremities: no cyanosis, clubbing or edema  Musculoskeletal: normal range of motion, no joint swelling, deformity or tenderness  Neurologic: alert, no focal neurologic deficits    Data Review:  CBC:   Lab Results   Component Value Date    WBC 8.5 07/08/2021    RBC 3.62 07/08/2021     BMP:   Lab Results   Component Value Date    GLUCOSE 86 07/09/2021    CO2 31 07/09/2021    BUN 43 07/09/2021    CREATININE 2.7 07/09/2021    CALCIUM 8.2 07/09/2021     ABG:   Lab Results   Component Value Date    HYH5EGD 32.0 07/09/2021    BEART 7.6 07/09/2021    F4VYTSHG 92.2 07/09/2021    PHART 7.473 07/09/2021    NFY1SQD 43.7 07/09/2021    PO2ART 60.0 07/09/2021    OEL8PNV 74.7 07/09/2021       Radiology: All pertinent images / reports were reviewed as a part of this visit. Narrative   EXAMINATION:   ONE XRAY VIEW OF THE CHEST       7/8/2021 2:50 pm       COMPARISON:   Chest x-ray dated 10/13/2019       HISTORY:   ORDERING SYSTEM PROVIDED HISTORY: SOB   TECHNOLOGIST PROVIDED HISTORY:   Reason for exam:->SOB   Reason for Exam: Abnormal Lab (had blood work performed this AM PCP stated to   report to ER d/t elevated K+)   Acuity: Acute   Type of Exam: Initial       FINDINGS:   HEART/MEDIASTINUM: The cardiac silhouette is enlarged, but stable.       PLEURA/LUNGS: There is mild pulmonary vascular congestion.  There are no   focal consolidations or pleural effusions. There is no appreciable   pneumothorax.       BONES/SOFT TISSUE: No acute abnormality.           Impression   Stable cardiomegaly and mild pulmonary vascular congestion. Problem List:     Acute hyperkalemia  CKD stage III/IV    Assessment/Plan:     Severe hyperkalemia with potassium of 7.2-improved with insulin/dextrose, Lokelma, calcium gluconate and IV sodium bicarbonate drip. Patient also diuresed very well with Lasix-2.4 L. Potassium 4.5, creatinine 2.7 patient remains asymptomatic. Patient does not have any further dialysis needs. Continue to hold lisinopril. Await nephrology recommendations. Critical care team will sign off.     Ken Moody MD

## 2021-07-09 NOTE — DISCHARGE SUMMARY
[facility]    Patient Demographics    Patient. Jhon Dubose  Date of Birth. 1945  MRN. [id number]     Primary care provider. Colton Lomax MD  (Tel: [phone])    Admit date: 7/8/2021    Discharge date 7/9/2021  Note Date: 7/9/2021     Reason for Hospitalization. Chief Complaint   Patient presents with    Abnormal Lab     had blood work performed this AM PCP stated to report to ER d/t elevated K+         Significant Findings. Active Problems:    Hyperkalemia  Resolved Problems:    * No resolved hospital problems. *       Problems and results from this hospitalization that need follow up. 1. CKD: Follow up with nephrology in 1 week     Significant test results and incidental findings. Invasive procedures and treatments. [facility] Course. The patient was advised admission due to a K level of 7.2 per nephrology. She was admitted and treated with lokelma, dextrose insulin and calcium gluconate. K on the following day was down to 4.6. Her ACE was held and she received counseling regarding a low K diet. Creat was stable at 2.7 on the day of d/c. She will follow up with nephrology in 1 week. HTN:  bp was in range   Anemia:  Hgb was stable at 10.5     She was feeling well and was eager to be released   Consults. IP CONSULT TO SOCIAL WORK  IP CONSULT TO NEPHROLOGY  IP CONSULT TO DIETITIAN    Physical examination on discharge day. BP (!) 117/59   Pulse 89   Temp 97.5 °F (36.4 °C) (Temporal)   Resp 14   Ht 5' 2\" (1.575 m)   Wt 143 lb 11.8 oz (65.2 kg)   SpO2 91%   BMI 26.29 kg/m²   General appearance. Alert. Looks comfortable. HEENT. Sclera clear. Moist mucus membranes. Cardiovascular. Regular rate and rhythm, normal S1, S2. No murmur. Respiratory. Not using accessory muscles. Clear to auscultation bilaterally, no

## 2021-07-09 NOTE — PROGRESS NOTES
Nutrition Education    Verbal consult received from Dr. Jim Frankel for low potassium diet education. K+ was 7.2 on admission, now 4.6. Reviewed list of high K+ foods with pt. She denies eating these foods on a regular basis. Encouraged pt to avoid/limit the foods on this list. Pt expressed understanding.      Electronically signed by Deborah Sosa RD, LD on 7/9/21 at 10:46 AM EDT    Contact: [phone]

## 2021-07-12 ENCOUNTER — CARE COORDINATION (OUTPATIENT)
Dept: CASE MANAGEMENT | Age: 76
End: 2021-07-12

## 2021-07-12 NOTE — CARE COORDINATION
[facility] Transitions Initial Follow Up Call    Call within 2 business days of discharge: Yes    Patient: Geronimo Quiñones Patient : 1945   MRN: [id number]  Reason for Admission: Hyperkalemia, CKD  Discharge Date: 21 RARS: Readmission Risk Score: 18    First attempt at 24 hour discharge call, no answer, CTN left VM with contact information and request for return call. CTN attempted outreach to mobile phone, no answer, did not leave VM. CTN will continue with outreach call attempts.       Gabriela Burns RN

## 2021-07-13 ENCOUNTER — CARE COORDINATION (OUTPATIENT)
Dept: CASE MANAGEMENT | Age: 76
End: 2021-07-13

## 2021-07-13 NOTE — CARE COORDINATION
[facility] Transitions Initial Follow Up Call: One call: Patient reports that she is doing well, denies any symptoms, questions, or concerns. Discussed discharge instructions and reviewed medications, patient reports that lisinopril has been discontinued. She will call PCP today to schedule follow up appointment. CTN will resolve episode and remain available. No further outreach calls indicated. Call within 2 business days of discharge: Yes    Patient: Clair Grey Patient : 1945   MRN: [id number]  Reason for Admission: Hyperkalemia, CKD  Discharge Date: 21 RARS: Readmission Risk Score: 18      Last Discharge [facility]       Complaint Diagnosis Description Type Department Provider    21 Abnormal Lab Hyperkalemia . .. ED to Hosp-Admission (Discharged) (ADMITTED) Jose Rich MD           Spoke with: Clair Grey      Transitions of Care Initial Call    Was this an external facility discharge? No Discharge Facility:     Challenges to be reviewed by the provider   Additional needs identified to be addressed with provider: No  none             Method of communication with provider : none      Advance Care Planning:   Does patient have an Advance Directive: reviewed and current, reviewed and needs to be updated, not on file; education provided, not on file, patient declined education, decision maker updated and referral to internal ACP facilitator. Was this a readmission? No  Patient stated reason for admission: \"labs and kidneys\"  Patients top risk factors for readmission: medical condition-.    Care Transition Nurse (CTN) contacted the patient by telephone to perform post hospital discharge assessment. Verified name and  with patient as identifiers. Provided introduction to self, and explanation of the CTN role. CTN reviewed discharge instructions, medical action plan and red flags with patient who verbalized understanding.  Patient given an opportunity to ask questions and does not have any further questions or concerns at this time. Were discharge instructions available to patient? Yes. Reviewed appropriate site of care based on symptoms and resources available to patient including: PCP, Urgent care clinics and When to call 911. The patient agrees to contact the PCP office for questions related to their healthcare. Medication reconciliation was performed with patient, who verbalizes understanding of administration of home medications. Advised obtaining a 90-day supply of all daily and as-needed medications. Reviewed and educated patient on any new and changed medications related to discharge diagnosis. Was patient discharged with a pulse oximeter? No     CTN provided contact information. No further follow-up call indicated based on severity of symptoms and risk factors. Plan for next call: symptom management-., self management-. and follow up appointment-.           Non-face-to-face services provided:  Obtained and reviewed discharge summary and/or continuity of care documents       Care Transitions 24 Hour Call    Do you have any ongoing symptoms?: No  Do you have a copy of your discharge instructions?: Yes  Do you have all of your prescriptions and are they filled?: Yes  Have you been contacted by a [email or web address]?: No  Have you scheduled your follow up appointment?: No  Were you discharged with any Home Care or Post Acute Services: No  Do you feel like you have everything you need to keep you well at home?: Yes  Care Transitions Interventions         Follow Up  Future Appointments   Date Time Provider Suraj Finley   10/12/2021 10:30 AM BEATRIZ Carolina - CNP [facility]       Victorino Harris RN

## 2021-07-20 NOTE — PROGRESS NOTES
kidney injury ruled out after study  -- Other - I will add my own diagnosis  -- Disagree - Not applicable / Not valid  -- Disagree - Clinically unable to determine / Unknown  -- Refer to Clinical Documentation Reviewer    PROVIDER RESPONSE TEXT:    Acute kidney injury was ruled out after study.     Query created by: Melo Eric on 7/16/2021 2:34 PM      Electronically signed by:  Chaparro Hubbard CNP 7/20/2021 12:02   PM

## 2021-10-12 ENCOUNTER — OFFICE VISIT (OUTPATIENT)
Dept: CARDIOLOGY CLINIC | Age: 76
End: 2021-10-12
Payer: MEDICARE

## 2021-10-12 VITALS
DIASTOLIC BLOOD PRESSURE: 52 MMHG | BODY MASS INDEX: 27.75 KG/M2 | OXYGEN SATURATION: 92 % | HEIGHT: 62 IN | SYSTOLIC BLOOD PRESSURE: 150 MMHG | HEART RATE: 104 BPM | WEIGHT: 150.8 LBS

## 2021-10-12 DIAGNOSIS — I25.10 CORONARY ARTERY DISEASE INVOLVING NATIVE CORONARY ARTERY OF NATIVE HEART WITHOUT ANGINA PECTORIS: Primary | ICD-10-CM

## 2021-10-12 DIAGNOSIS — E78.2 MIXED HYPERLIPIDEMIA: ICD-10-CM

## 2021-10-12 DIAGNOSIS — I10 ESSENTIAL HYPERTENSION: ICD-10-CM

## 2021-10-12 DIAGNOSIS — I38 VALVULAR HEART DISEASE: ICD-10-CM

## 2021-10-12 PROCEDURE — 99214 OFFICE O/P EST MOD 30 MIN: CPT | Performed by: NURSE PRACTITIONER

## 2021-10-12 NOTE — PATIENT INSTRUCTIONS
Cholesterol levels with your next set of labs    Echocardiogram in December to look at your valves    appt in six months

## 2021-10-12 NOTE — PROGRESS NOTES
[address]     Outpatient Follow Up Note    Tico Noavk is 64 y.o. female who presents today with a history of NSTEMI, apical HK, non-obst CAD and valvular disease. CHIEF COMPLAINT / HPI:  Follow Up secondary to NSTEMI / apical HK    Subjective:     She denies chest discomfort. Her angina was flu-like symptoms, weak with n/v. She denies SOB. The patient denies orthopnea/PND. The patient has swelling in her feet every now/then after being up. Its gone by the next morning. Its no worse since stopping lasix. The patients weight fluctuates b/w 149-51# at 150.8# today . The patient is not experiencing palpitations or dizziness. She'll take either tart cherry juice or a cherry pill and beet juice to help with her gout. Her BP runs ~ 145/60    These symptoms are stable since the last OV. With regard to medication therapy the patient has been compliant with prescribed regimen. They have tolerated therapy to date.      Past Medical History:   Diagnosis Date    CKD (chronic kidney disease)     stage 3, follows with kidney and HTN center    Hyperlipidemia     Hypertension     MI [facility]ocardial infarction) ([facility] [address]     Water retention     ankle swelling     Social History:    Social History     Tobacco Use   Smoking Status Current Every Day Smoker    Packs/day: 0.75    Years: 60.00    Pack years: 45.00    Types: Cigarettes   Smokeless Tobacco Never Used     Current Medications:  Current Outpatient Medications   Medication Sig Dispense Refill    TART CHERRY PO Take 4 oz by mouth four times a week      metoprolol succinate (TOPROL XL) 25 MG extended release tablet TAKE ONE TABLET BY MOUTH DAILY 90 tablet 1    aspirin 81 MG chewable tablet Take 1 tablet by mouth daily 30 tablet 3    atorvastatin (LIPITOR) 40 MG tablet Take 1 tablet by mouth daily 30 tablet 0    amLODIPine (NORVASC) 10 MG tablet Take 1 tablet by mouth 2 times daily      ferrous sulfate 325 (65 Fe) MG tablet Take 1 tablet by mouth 3 times daily (with meals)       sodium bicarbonate 650 MG tablet Take 2 tablets by mouth 3 times daily      allopurinol (ZYLOPRIM) 100 MG tablet Take 1 tablet by mouth daily      Cholecalciferol (VITAMIN D3) 2000 UNITS CAPS Take 1 capsule by mouth daily      epoetin ernesto (PROCRIT) 80772 UNIT/ML injection Infuse 6,800 Units intravenously every 14 days If Hgb < 10 prn (Patient not taking: Reported on 10/12/2021)       No current facility-administered medications for this visit. REVIEW OF SYSTEMS:    CONSTITUTIONAL: No major weight gain or loss, fatigue, weakness, night sweats or fever. HEENT: No new vision difficulties or ringing in the ears. RESPIRATORY: No new SOB, PND, orthopnea or cough. CARDIOVASCULAR: See HPI  GI: No nausea, vomiting, diarrhea, constipation, abdominal pain or changes in bowel habits. : No urinary frequency, urgency, incontinence hematuria or dysuria. SKIN: No cyanosis or skin lesions. MUSCULOSKELETAL: No new muscle or joint pain. NEUROLOGICAL: No syncope or TIA-like symptoms. PSYCHIATRIC: No anxiety, pain, insomnia or depression    Objective:   PHYSICAL EXAM:       Vitals:    10/12/21 1048 10/12/21 1110   BP: (!) 160/60 (!) 150/52   Site: Left Upper Arm Right Upper Arm   Position: Sitting    Cuff Size: Medium Adult    Pulse: 104    SpO2: 92%    Weight: 150 lb 12.8 oz (68.4 kg)    Height: 5' 2\" (1.575 m)         VITALS:  BP (!) 150/52 (Site: Right Upper Arm)   Pulse 104   Ht 5' 2\" (1.575 m)   Wt 150 lb 12.8 oz (68.4 kg)   SpO2 92%   BMI 27.58 kg/m²   CONSTITUTIONAL: Cooperative, no apparent distress, and appears well nourished / developed  NEUROLOGIC:  Awake and orientated to person, place and time. PSYCH: Calm affect. SKIN: Warm and dry. HEENT: Sclera non-icteric, normocephalic, neck supple, no elevation of JVP, normal carotid pulses with no bruits and thyroid normal size.   LUNGS:  No increased work of breathing and clear to auscultation, no crackles or wheezing  CARDIOVASCULAR:  Regular rate 80 and rhythm with 2nd ICS Rt MCL, 4th ICS Lt MCL murmurs, gallops, rubs, or abnormal heart sounds, normal PMI. The apical impulses not displaced  JVP less than 8 cm H2O  Heart tones are crisp and normal  Cervical veins are not engorged  The carotid upstroke is normal in amplitude and contour without delay or bruit  JVP is not elevated  ABDOMEN:  Normal bowel sounds, non-distended and non-tender to palpation  EXT: No edema, no calf tenderness. Pulses are present bilaterally. DATA:    Lab Results   Component Value Date    ALT 7 (L) 07/08/2021    AST 16 07/08/2021    ALKPHOS 116 07/08/2021    BILITOT <0.2 07/08/2021     Lab Results   Component Value Date    CREATININE 2.7 (H) 07/09/2021    BUN 43 (H) 07/09/2021     07/09/2021    K 4.5 07/09/2021    CL 99 07/09/2021    CO2 31 07/09/2021     No results found for: TSH, U9MXWRA, L0FJQIY, THYROIDAB  Lab Results   Component Value Date    WBC 8.5 07/08/2021    HGB 10.5 (L) 07/08/2021    HCT 34.4 (L) 07/08/2021    MCV 95.0 07/08/2021     07/08/2021     LABS: 1/27/20:    trig 127 HDL 55      10/1/21:   A1c 10.2    Radiology Review:  Pertinent images / reports were reviewed as a part of this visit and reveals the following:    Echo: Oct '19:  Summary  Left ventricle - normal size and function with EF of 60%  Wall motion - apical hypokinesis  Left atrial enlargement noted. Aortic valve - thickened and calcified, mild stenosis with mean gradient 10mmHg, no regurgitation  Mitral valve - annular calcification, thickened and calcified with peak velocity of 2.17 m/s and a mean pressure gradient of 7 mmHg consistent with stenosis, mild regurgitation, area 2.75cm2 per p1/2t  Tricuspid valve - moderate regurgitation with RVSP of 38mmHg   ~Late peaking gradient recorded across the LV outflow tract consistent with dynamic obstruction with a peak gradient of 100 mmHg.  Mitral annular calcification appears to override LVOT.     Last Angiogram: 10/10/19   LM-Normal No disease   LAD-prox 20%, mid 20% diffuse luminal irregularities  Cx-Large vessel. Mild luminal irregularities  OM- mid 40%  RCA-Dominant, proximal 40% diffuse ectasia  RPDA- Normal no disease  LVEDP- 25    EKG: 10/22/20:  No acute ischemic or injury pattern T waves may be a little bit prominent in V3 and V4 compatible with her hyperkalemia    Echo: 12/14/20:   Summary   -Left ventricular cavity size is normal.   -Overall left ventricular systolic function appears normal.   -Ejection fraction is visually estimated to be 60-65%. -There is mild concentric left ventricular hypertrophy.   -No regional wall motion abnormalities are noted. -Grade I diastolic dysfunction with normal LV filling pressures. E/e' = 24.2.   -The aortic valve appears sclerotic but opens well. -There is heavy mitral annular calcification.   -Mitral valve Vmax is 2.2 cm/s. Mean pressure gradient is 7 mmHG. -Volume index is normal, but left atrium appears dilated. -Moderate mitral regurgitation.   -Mild tricuspid regurgitation.   -Aortic valve Vmax is 1.7 cm/s and the mean gradient is 5 mmHG. Assessment:      Diagnosis Orders   1. Coronary artery disease involving native coronary artery of native heart without angina pectoris   ~stable : denies angina  ~normal LVEF by echo Dec '20  ~mild, non-obst disease by cath Oct '19  ~amlodipine / ASA / atorvastatin / metoprolol    2. Valvular heart disease   ~progression of MR noted as moderate; heavy annular calcification   ~mild MR, mod stenosis; and aortic regurg in Oct '19  ~denies SOB  ~ no longer on an ace or diuretic after change in renal status    3. Essential hypertension   ~elevated in office today ; controlled at 138/72 when seen by NE in August  ~off lisinopril d/t hyperkalemia  ~ BP goal < 140/90; taking norvasc 10 mg bid with toprol 25 mg daily    4.  Syncope, unspecified syncope type   ~resolved  ~echo with MR / TR; aortic sclerotic but opens well   ~abnl echo Oct '19 with LV outflow tract consistent with dynamic obst.    5.     Mixed hyperlipemia   ~not recently done   ~atorvastatin 40 mg daily    I had the opportunity to review the clinical symptoms and presentation of Susanne Quintana. Plan:     1. Lipid profile with labs planned for this Friday   2. Echocardiogram in Dec : surveillance for MR  3. F/U in 6 months   F/U with NE for BP management     Overall the patient is stable from CV standpoint    I have addresed the patient's cardiac risk factors and adjusted pharmacologic treatment as needed. In addition, I have reinforced the need for patient directed risk factor modification. Further evaluation will be based upon the patient's clinical course and testing results. All questions and concerns were addressed to the patient. Alternatives to my treatment were discussed. The patient is not currently smoking. The risks related to smoking were reviewed with the patient. Recommend maintaining a smoke-free lifestyle. Patient is on a beta-blocker   Patient is not on an ace-i/ARB: stopped by NE with hyperkalemia (is not taking Lokelma), stage IV followed by Dr. Martha Cintron  Patient is on a statin     Antiplatelet therapy has been recommended / prescribed for this patient. Education conducted on adverse reactions including bleeding was discussed. The patient verbalizes understanding not to stop medications without discussing with us. Discussed exercise: 30-60 minutes 7 days/week  Discussed Low saturated fat diet. Thank you for allowing to us to participate in the care of Susanne Quintana.     BEATRIZ Grace    Documentation of today's visit sent to PCP

## 2021-11-08 ENCOUNTER — TELEPHONE (OUTPATIENT)
Dept: CARDIOLOGY CLINIC | Age: 76
End: 2021-11-08

## 2021-11-08 NOTE — TELEPHONE ENCOUNTER
Asking that order for her labs for lipids be faxed to [phone].  Going there on Friday

## 2021-11-15 ENCOUNTER — TELEPHONE (OUTPATIENT)
Dept: CARDIOLOGY CLINIC | Age: 76
End: 2021-11-15

## 2021-11-15 NOTE — TELEPHONE ENCOUNTER
----- Message from BEATRIZ Banks CNP sent at 11/15/2021  8:19 AM EST -----  Needs to follow up with PCP for elevated A1c

## 2021-12-21 ENCOUNTER — HOSPITAL ENCOUNTER (OUTPATIENT)
Dept: NON INVASIVE DIAGNOSTICS | Age: 76
Discharge: HOME | End: 2021-12-21
Payer: MEDICARE

## 2021-12-21 DIAGNOSIS — I38 VALVULAR HEART DISEASE: ICD-10-CM

## 2021-12-21 LAB
LV EF: 65 %
LVEF MODALITY: NORMAL

## 2021-12-21 PROCEDURE — 93306 TTE W/DOPPLER COMPLETE: CPT

## 2021-12-23 ENCOUNTER — TELEPHONE (OUTPATIENT)
Dept: CARDIOLOGY CLINIC | Age: 76
End: 2021-12-23

## 2021-12-23 NOTE — TELEPHONE ENCOUNTER
Patient called back about the message below and verbalized understanding.    ~    Bj Daniels, BEATRIZ Hubbard CNP   12/23/2021  7:42 AM EST         Has progression with mitral stenosis.  Needs an appt with Dr. Ryan Lerma in the near future.

## 2021-12-23 NOTE — TELEPHONE ENCOUNTER
----- Message from BEATRIZ Bolden CNP sent at 12/23/2021  7:42 AM EST -----  Has progression with mitral stenosis. Needs an appt with Dr. Nayeli Frye in the near future.

## 2021-12-23 NOTE — TELEPHONE ENCOUNTER
----- Message from Sindy Ormond, APRN - CNP sent at 12/23/2021  7:43 AM EST -----  I left a message for her to call back

## 2022-01-03 PROBLEM — Z72.0 TOBACCO ABUSE: Status: ACTIVE | Noted: 2022-01-03

## 2022-01-03 PROBLEM — I34.2 NONRHEUMATIC MITRAL VALVE STENOSIS: Status: ACTIVE | Noted: 2022-01-03

## 2022-01-03 NOTE — ASSESSMENT & PLAN NOTE
Echo 12/21- severe mitral stenosis with heavy MAC  Plan~ she is asymptomatic. Upon reviewing echo, mitral stenosis is likely mild to moderate not severe. The Mitral valve dopplers are not accurate and were misinterpreted. The mitral valve is heavily calcified with restricted leaflet motion but opens well. Recommend cardiac MRI to further assess mitral valve disease. Discussed options of getting a cardiac MRI however, surgery is likely not indicated at this point given the fact that she is not having any symptoms. She would like to discuss with her family and call if they prefer that route. Otherwise we will continue to monitor her symptoms.  F/u with NPTS in 6 mos

## 2022-01-03 NOTE — PATIENT INSTRUCTIONS
Your mitral stenosis is likely moderate and not severe  Call us if you begin to have shortness of breath with activity, feel like you cannot breath. For now we can monitor your heart (with echocardiograms) for now and have medications to treat. Or we can do a cardiac MRI to get a better picture now.    Call the office if you would like the MRI and we can order it (they are done at [address] [place] [address])

## 2022-01-03 NOTE — ASSESSMENT & PLAN NOTE
Angina ~ none  CCS class 1  Intervention  [facility]~ '19 mild to moderate disease RCA/OM  Echo~ 60%  Current meds~ asa / Toprol  Plan~  Continue medications

## 2022-01-03 NOTE — ASSESSMENT & PLAN NOTE
Current smoker~ yes  How much~ 3/4 ppd  Trying to quit~ not interested in alternatives at this time  Counseled on importance of smoking cessation as well as options to assist with quitting.

## 2022-01-03 NOTE — ASSESSMENT & PLAN NOTE
/64 (Site: Right Upper Arm, Position: Sitting, Cuff Size: Medium Adult)   Pulse 92   Ht 5' 2\" (1.575 m)   Wt 146 lb 9.6 oz (66.5 kg)   SpO2 98%   BMI 26.81 kg/m²   Meds~ amlodipine  Plan~ stable

## 2022-01-03 NOTE — PROGRESS NOTES
[facility]   Cardiac Evaluation      Patient: Ryan Lu  YOB: 1945         Chief Complaint   Patient presents with    Hypertension    Hyperlipidemia    Coronary Artery Disease        Referring provider: Earnestine Tian MD    History of Present Illness:  Ms Scout Nunez is a 66 y.0. female here for routine follow up for mild-mod CAD, mod MR, severe mitral stenosis, Htn, Hld. She also has a PMH of CKD and anemia. She is not dialysis dependent at this time, she is followed by Dr Brooke Cameron @ [facility]. Today she is here with her daughter. She denies any chest pain or shortness of breath. She admits that she is not very active, she goes to the grocery which is the most walking she gets. She feels her heart pounding if she is overly excited but not often. Her daughter saw her echo results on Mychart. They are concerned about her Mitral valve. With regard to medication therapy he/she has been compliant with prescribed regimen and has tolerated therapy to date. Past Medical History:   has a past medical history of CKD (chronic kidney disease), Hyperlipidemia, Hypertension, MI (myocardial infarction) ([facility] [address] and Water retention. Surgical History:   has a past surgical history that includes Cholecystectomy; hernia repair; Hysterectomy; Breast surgery; and Upper gastrointestinal endoscopy (N/A, 10/7/2019).      Current Outpatient Medications   Medication Sig Dispense Refill    TART CHERRY PO Take 4 oz by mouth four times a week      metoprolol succinate (TOPROL XL) 25 MG extended release tablet TAKE ONE TABLET BY MOUTH DAILY 90 tablet 1    epoetin ernesto (PROCRIT) 45598 UNIT/ML injection Infuse 6,800 Units intravenously every 14 days If Hgb < 10 prn      aspirin 81 MG chewable tablet Take 1 tablet by mouth daily 30 tablet 3    atorvastatin (LIPITOR) 40 MG tablet Take 1 tablet by mouth daily 30 tablet 0    amLODIPine (NORVASC) 10 MG tablet Take 1 tablet by mouth 2 times daily  ferrous sulfate 325 (65 Fe) MG tablet Take 1 tablet by mouth 3 times daily (with meals)       sodium bicarbonate 650 MG tablet Take 2 tablets by mouth 3 times daily      allopurinol (ZYLOPRIM) 100 MG tablet Take 1 tablet by mouth daily      Cholecalciferol (VITAMIN D3) 2000 UNITS CAPS Take 1 capsule by mouth daily       No current facility-administered medications for this visit. Allergies:  Patient has no known allergies. Social History:  Social History     Socioeconomic History    Marital status: Single     Spouse name: Not on file    Number of children: Not on file    Years of education: Not on file    Highest education level: Not on file   Occupational History    Not on file   Tobacco Use    Smoking status: Current Every Day Smoker     Packs/day: 0.75     Years: 60.00     Pack years: 45.00     Types: Cigarettes    Smokeless tobacco: Never Used   Vaping Use    Vaping Use: Never used    Passive vaping exposure: Yes   Substance and Sexual Activity    Alcohol use: Yes     Comment: socially    Drug use: No    Sexual activity: Not on file   Other Topics Concern    Not on file   Social History Narrative    Not on file     Social Determinants of Health     Financial Resource Strain:     Difficulty of Paying Living Expenses: Not on file   Food Insecurity:     Worried About [employer] in the Last Year: Not on file    [employer] of Food in the Last Year: Not on file   Transportation Needs:     Lack of Transportation (Medical): Not on file    Lack of Transportation (Non-Medical):  Not on file   Physical Activity:     Days of Exercise per Week: Not on file    Minutes of Exercise per Session: Not on file   Stress:     Feeling of Stress : Not on file   Social Connections:     Frequency of Communication with Friends and Family: Not on file    Frequency of Social Gatherings with Friends and Family: Not on file    Attends Zoroastrian Services: Not on file   [employer] or Organizations: Not on file    Attends Club or Organization Meetings: Not on file    Marital Status: Not on file   Intimate Partner Violence:     Fear of Current or Ex-Partner: Not on file    Emotionally Abused: Not on file    Physically Abused: Not on file    Sexually Abused: Not on file   Housing Stability:     Unable to Pay for Housing in the Last Year: Not on file    Number of [place] in the Last Year: Not on file    Unstable Housing in the Last Year: Not on file       Family History:   History reviewed. No pertinent family history. Family history has been reviewed and not pertinent except as noted above. Review of Systems:   · Constitutional: there has been no unanticipated weight loss. No change in energy or activity level   · Eyes: No visual changes   · ENT: No Headaches, hearing loss or vertigo. No mouth sores or sore throat. · Cardiovascular: Reviewed in HPI  · Respiratory: No cough or wheezing, no sputum production. · Gastrointestinal: No abdominal pain, appetite loss, blood in stools. No change in bowel or bladder habits. · Genitourinary: No nocturia, dysuria, trouble voiding  · Musculoskeletal:  No gait disturbance, weakness or joint complaints. · Integumentary: No rash or pruritis. · Neurological: No headache, change in muscle strength, numbness or tingling. No change in gait, balance, coordination, mood, affect, memory, mentation, behavior. · Psychiatric: No anxiety or depression  · Endocrine: No malaise or fever  · Hematologic/Lymphatic: No abnormal bruising or bleeding, blood clots or swollen lymph nodes. · Allergic/Immunologic: No nasal congestion or hives. Physical Examination:    Vitals:    01/07/22 0920   BP: 132/64   Site: Right Upper Arm   Position: Sitting   Cuff Size: Medium Adult   Pulse: 92   SpO2: 98%   Weight: 146 lb 9.6 oz (66.5 kg)   Height: 5' 2\" (1.575 m)     Body mass index is 26.81 kg/m².      Wt Readings from Last 3 Encounters:   01/07/22 146 lb 9.6 oz (66.5 kg)   10/12/21 150 lb 12.8 oz (68.4 kg)   07/09/21 143 lb 11.8 oz (65.2 kg)      BP Readings from Last 3 Encounters:   01/07/22 132/64   10/12/21 (!) 150/52   07/09/21 (!) 124/45        Physical Examination:    · CONSTITUTIONAL: Well developed, well nourished  · EYES: PERRLA. No xanthelasma, sclera non icteric  · EARS,NOSE,MOUTH,THROAT:  Mucous membranes moist, normal hearing  · NECK: Supple, JVP normal, thyroid not enlarged. Carotids 2+ without bruits  · RESPIRATORY: Normal effort, no rales or rhonchi  · CARDIOVASCULAR: Normal PMI, regular rate and rhythm, +systolic murmurs, rub or gallop. No edema. Radial pulses present and equal  · CHEST: No scar or masses  · ABDOMEN: Normal bowel sounds. No masses or tenderness. No bruit  · MUSCULOSKELETAL: No clubbing or cyanosis. Moves all extremities well. Normal gait  · SKIN:  Warm and dry. No rashes  · NEUROLOGIC: Cranial nerves intact. Alert and oriented  · PSYCHIATRIC: Calm affect. Appears to have normal judgement and insight    All testing and labs listed below were personally reviewed by myself. MCOT 12/2020  SR   range / 83 HR average  PVC 4% burden  PAC <1% burden    Echo 12/21  Summary  Normal LV size and systolic function. LVEF   65%. Severe mitral stenosis with heavy MAC. Mild to moderate mitral regurgitation. The right ventricle is normal in size and function. Mild tricuspid regurgitation. RVSP estimated at 31 mmHg. Echo 12/2020  Summary  Left ventricular cavity size is normal.  Overall left ventricular systolic function appears normal. Ejection fraction is visually estimated to be 60-65%. There is mild concentric left ventricular hypertrophy. No regional wall motion abnormalities are noted. Grade I diastolic dysfunction with normal LV filling pressures. E/e' = 24.2. The aortic valve appears sclerotic but opens well. There is heavy mitral annular calcification. Mitral valve Vmax is 2.2 cm/s. Mean pressure gradient is 7 mmHG.   Volume index is normal, but left atrium appears dilated. Moderate mitral regurgitation. Mild tricuspid regurgitation. Aortic valve Vmax is 1.7 cm/s and the mean gradient is 5 mmHG. Cardiac Cath : 10/2019 [facility])  Anatomy:   LM-Normal No disease   LAD-prox 20%, mid 20% diffuse luminal irregularities  Cx-Large vessel. Mild luminal irregularities  OM- mid 40%  RCA-Dominant, proximal 40% diffuse ectasia  RPDA- Normal no disease  LVEDP- 25     Contrast: 36cc  Flouro Time: 2.5m  Access: R radial     Impression  ~Coronary Angiography w/ Mild to moderate disease  ~High LVEDP     Recommendation  ~Aggressive medical treatment and risk factor modification  ~1. Medications reviewed, no changes at this time. 2. Stop heparin gtt. No IVF. Bedrest.  3. No indication for plavix therapy. Cont aspirin and statin. 4. Patient has been advised on the importance of regular exercise of at least 20-30 minutes daily alternating with aerobic and isometric activities. 5. Patient counseled about and offered assistance for smoking cessation   6. No indication for cardiac rehab  7. Follow up in 2 months with cardiology   8. Give lasix 20mg IV. Assessment/Plan  1. Tobacco abuse    2. Coronary artery disease involving native coronary artery of native heart without angina pectoris    3. Mitral valve insufficiency, unspecified etiology    4. Essential hypertension    5. Mixed hyperlipidemia    6.  Nonrheumatic mitral valve stenosis          Coronary artery disease involving native coronary artery of native heart without angina pectoris  Angina ~ none  CCS class 1  Intervention  [facility]~ '19 mild to moderate disease RCA/OM  Echo~ 60%  Current meds~ asa / Toprol  Plan~  Continue medications    Mitral valve insufficiency  Mild to mod on echo 12/2021  Moderate on echo 12/2020  Plan~ stable       Essential hypertension  /64 (Site: Right Upper Arm, Position: Sitting, Cuff Size: Medium Adult)   Pulse 92   Ht 5' 2\" (1.575 m)   Wt 146 lb 9.6 oz (66.5 kg) SpO2 98%   BMI 26.81 kg/m²   Meds~ amlodipine  Plan~ stable      Hyperlipidemia  11/21     LDL  101   HDL 51  Meds~ lipitor  Plan~ encouraged smoking cessation. Continue lipitor      Tobacco abuse  Current smoker~ yes  How much~ 3/4 ppd  Trying to quit~ not interested in alternatives at this time  Counseled on importance of smoking cessation as well as options to assist with quitting. Nonrheumatic mitral valve stenosis  Echo 12/21- severe mitral stenosis with heavy MAC  Plan~ she is asymptomatic. Upon reviewing echo, mitral stenosis is likely mild to moderate not severe. The Mitral valve dopplers are not accurate and were misinterpreted. The mitral valve is heavily calcified with restricted leaflet motion but opens well. Recommend cardiac MRI to further assess mitral valve disease. Discussed options of getting a cardiac MRI however, surgery is likely not indicated at this point given the fact that she is not having any symptoms. She would like to discuss with her family and call if they prefer that route. Otherwise we will continue to monitor her symptoms. F/u with NPTS in 6 mos    No orders of the defined types were placed in this encounter. Follow up in 6 months with NPTS     Katerin Dunne MD      Thank you for allowing to me to participate in the care of Brandyn Elder. Scribe's Attestation: This note was scribed in the presence of Dr. Raissa Mcclendon MD by Angeles Mary RN.       I, Dr. Raissa Mcclendon, personally performed the services described in this documentation, as scribed by the above signed scribe in my presence. It is both accurate and complete to my knowledge. I agree with the details independently gathered by the clinical support staff, while the remaining scribed note accurately describes my personal service to the patient.

## 2022-01-07 ENCOUNTER — OFFICE VISIT (OUTPATIENT)
Dept: CARDIOLOGY CLINIC | Age: 77
End: 2022-01-07
Payer: MEDICARE

## 2022-01-07 VITALS
HEART RATE: 92 BPM | HEIGHT: 62 IN | WEIGHT: 146.6 LBS | OXYGEN SATURATION: 98 % | DIASTOLIC BLOOD PRESSURE: 64 MMHG | BODY MASS INDEX: 26.98 KG/M2 | SYSTOLIC BLOOD PRESSURE: 132 MMHG

## 2022-01-07 DIAGNOSIS — E78.2 MIXED HYPERLIPIDEMIA: ICD-10-CM

## 2022-01-07 DIAGNOSIS — I10 ESSENTIAL HYPERTENSION: ICD-10-CM

## 2022-01-07 DIAGNOSIS — I34.0 MITRAL VALVE INSUFFICIENCY, UNSPECIFIED ETIOLOGY: ICD-10-CM

## 2022-01-07 DIAGNOSIS — I34.2 NONRHEUMATIC MITRAL VALVE STENOSIS: ICD-10-CM

## 2022-01-07 DIAGNOSIS — I25.10 CORONARY ARTERY DISEASE INVOLVING NATIVE CORONARY ARTERY OF NATIVE HEART WITHOUT ANGINA PECTORIS: ICD-10-CM

## 2022-01-07 DIAGNOSIS — Z72.0 TOBACCO ABUSE: Primary | ICD-10-CM

## 2022-01-07 PROCEDURE — 99214 OFFICE O/P EST MOD 30 MIN: CPT | Performed by: INTERNAL MEDICINE

## 2022-02-08 NOTE — TELEPHONE ENCOUNTER
[facility] requesting 90 day RX metoprolol 25 mg qd  Last ov 1/7/22 PC dx: cad htn hld med verified next ov 7/7/22 TS

## 2022-02-09 RX ORDER — METOPROLOL SUCCINATE 25 MG/1
TABLET, EXTENDED RELEASE ORAL
Qty: 90 TABLET | Refills: 2 | Status: SHIPPED | OUTPATIENT
Start: 2022-02-09 | End: 2022-02-21 | Stop reason: SDUPTHER

## 2022-02-21 NOTE — TELEPHONE ENCOUNTER
[facility] requesting refill Last ov  1/7/22 PC dx: cad htn hld valve  Med verified next ov 7/7/22 TS

## 2022-02-24 RX ORDER — METOPROLOL SUCCINATE 25 MG/1
TABLET, EXTENDED RELEASE ORAL
Qty: 90 TABLET | Refills: 1 | Status: SHIPPED | OUTPATIENT
Start: 2022-02-24 | End: 2022-09-12

## 2022-04-05 ENCOUNTER — TELEPHONE (OUTPATIENT)
Dept: CARDIOLOGY CLINIC | Age: 77
End: 2022-04-05

## 2022-04-05 NOTE — TELEPHONE ENCOUNTER
Pt had pain across shoulders and up neck and went to [address] and had EKG, Stress test and xray. Her PCP wants her to see [facility].  Please advise where pt can be added

## 2022-04-12 ENCOUNTER — OFFICE VISIT (OUTPATIENT)
Dept: CARDIOLOGY CLINIC | Age: 77
End: 2022-04-12
Payer: MEDICARE

## 2022-04-12 VITALS
SYSTOLIC BLOOD PRESSURE: 132 MMHG | HEART RATE: 91 BPM | HEIGHT: 62 IN | BODY MASS INDEX: 26.13 KG/M2 | OXYGEN SATURATION: 99 % | WEIGHT: 142 LBS | DIASTOLIC BLOOD PRESSURE: 62 MMHG

## 2022-04-12 DIAGNOSIS — I38 VALVULAR HEART DISEASE: ICD-10-CM

## 2022-04-12 DIAGNOSIS — I25.10 CORONARY ARTERY DISEASE INVOLVING NATIVE CORONARY ARTERY OF NATIVE HEART WITHOUT ANGINA PECTORIS: Primary | ICD-10-CM

## 2022-04-12 DIAGNOSIS — I10 ESSENTIAL HYPERTENSION: ICD-10-CM

## 2022-04-12 PROCEDURE — 99214 OFFICE O/P EST MOD 30 MIN: CPT | Performed by: NURSE PRACTITIONER

## 2022-04-12 PROCEDURE — 93000 ELECTROCARDIOGRAM COMPLETE: CPT | Performed by: NURSE PRACTITIONER

## 2022-04-12 NOTE — PROGRESS NOTES
[address]     Outpatient Follow Up Note    Chaz Chamorro is 64 y.o. female who presents today with a history of NSTEMI, apical HK, non-obst CAD and valvular disease. Interval hx: 34/22  64year old female with stage IV CKD, CAD, T2DM presenting with episode of chest pain which had resolved by time of arrival to ED. He presented with left shoulder pain that radiated up her neck that is identical to her prior MI in 2019. She does not live with angina. Does smoke 3/4 ppd cigs. The pain started at rest but she reports psych stress. It lasted 2 hrs before spontaneously resolving. She did not take anything at home. Had associated sweats. Suspected non cardiac Chest pain  HS trop 8->10 which is reassuring. EKG NSR no acute st-t changes. Given risk factors (hx of CAD) stress test performed and was unremarkable  She will continue aspirin statin beta blocker and follow up with her cardiologist    Second degree type 1 heart block  Noted incidentally on monitoring  D/w cardiology and okay to monitor and continue low dose beta blocker  Red flag symptoms d/w patient and advised to follow with her cardiologist for further monitoring       CHIEF COMPLAINT / HPI:  Follow Up secondary to pain   She recalls having had tightness across her shoulders and travelling up her neck. Her heart was racing. It reminded her of her symptoms before her mini heart attack  Subjective:   She denies recurrence of shoulder/neck discomfort. She brittney flu-like symptoms    She's had no SOB / orthopnea / PND. She has no swelling and wearing her compression socks. Her weight is stable    She denies chest discomfort. Her angina was flu-like symptoms, weak with n/v. The patient is not experiencing palpitations or dizziness. Her BP runs ~ 140/ . These symptoms are new since the last OV yet improved from discharge. With regard to medication therapy the patient has been compliant with prescribed regimen.  They have tolerated therapy to date. Past Medical History:   Diagnosis Date    CKD (chronic kidney disease)     stage 3, follows with kidney [facility]    Hyperlipidemia     Hypertension     MI [facility] infarction) ([facility] [address]     Water retention     ankle swelling     Social History:    Social History     Tobacco Use   Smoking Status Current Every Day Smoker    Packs/day: 0.75    Years: 60.00    Pack years: 45.00    Types: Cigarettes   Smokeless Tobacco Never Used     Current Medications:  Current Outpatient Medications   Medication Sig Dispense Refill    metoprolol succinate (TOPROL XL) 25 MG extended release tablet 1 tablet qd 90 tablet 1    TART CHERRY PO Take 4 oz by mouth four times a week      epoetin ernesto (PROCRIT) 81053 UNIT/ML injection Infuse 6,800 Units intravenously every 14 days If Hgb < 10 prn      aspirin 81 MG chewable tablet Take 1 tablet by mouth daily 30 tablet 3    atorvastatin (LIPITOR) 40 MG tablet Take 1 tablet by mouth daily 30 tablet 0    amLODIPine (NORVASC) 10 MG tablet Take 1 tablet by mouth 2 times daily      ferrous sulfate 325 (65 Fe) MG tablet Take 1 tablet by mouth 3 times daily (with meals)       sodium bicarbonate 650 MG tablet Take 2 tablets by mouth 3 times daily      allopurinol (ZYLOPRIM) 100 MG tablet Take 1 tablet by mouth daily      Cholecalciferol (VITAMIN D3) 2000 UNITS CAPS Take 1 capsule by mouth daily       No current facility-administered medications for this visit. REVIEW OF SYSTEMS:    CONSTITUTIONAL: No major weight gain or loss, fatigue, weakness, night sweats or fever. HEENT: No new vision difficulties or ringing in the ears. RESPIRATORY: No new SOB, PND, orthopnea or cough. CARDIOVASCULAR: See HPI  GI: No nausea, vomiting, diarrhea, constipation, abdominal pain or changes in bowel habits. : No urinary frequency, urgency, incontinence hematuria or dysuria. SKIN: No cyanosis or skin lesions.   MUSCULOSKELETAL: No new muscle or joint pain.  NEUROLOGICAL: No syncope or TIA-like symptoms. PSYCHIATRIC: No anxiety, pain, insomnia or depression    Objective:   PHYSICAL EXAM:       Vitals:    04/12/22 1447 04/12/22 1504   BP: (!) 120/52 132/62   Site: Left Upper Arm    Position: Sitting    Cuff Size: Medium Adult    Pulse: 91    SpO2: 99%    Weight: 142 lb (64.4 kg)    Height: 5' 2\" (1.575 m)         VITALS:  BP (!) 120/52 (Site: Left Upper Arm, Position: Sitting, Cuff Size: Medium Adult)   Pulse 91   Ht 5' 2\" (1.575 m)   Wt 142 lb (64.4 kg)   SpO2 99%   BMI 25.97 kg/m²   CONSTITUTIONAL: Cooperative, no apparent distress, and appears well nourished / developed  NEUROLOGIC:  Awake and orientated to person, place and time. PSYCH: Calm affect. SKIN: Warm and dry. HEENT: Sclera non-icteric, normocephalic, neck supple, no elevation of JVP, normal carotid pulses with no bruits and thyroid normal size. LUNGS:  No increased work of breathing and clear to auscultation, no crackles or wheezing  CARDIOVASCULAR:  Regular rate 88 and rhythm with 2nd ICS Rt MCL, 4th ICS Lt MCL murmurs, gallops, rubs, or abnormal heart sounds, normal PMI. The apical impulses not displaced  JVP less than 8 cm H2O  Heart tones are crisp and normal  Cervical veins are not engorged  The carotid upstroke is normal in amplitude and contour without delay or bruit  JVP is not elevated  ABDOMEN:  Normal bowel sounds, non-distended and non-tender to palpation  EXT: No edema, no calf tenderness. Pulses are present bilaterally.     DATA:    Lab Results   Component Value Date    ALT 7 (L) 07/08/2021    AST 16 07/08/2021    ALKPHOS 116 07/08/2021    BILITOT <0.2 07/08/2021     Lab Results   Component Value Date    CREATININE 2.7 (H) 07/09/2021    BUN 43 (H) 07/09/2021     07/09/2021    K 4.5 07/09/2021    CL 99 07/09/2021    CO2 31 07/09/2021     No results found for: TSH, E0CHDIN, F8UJQKS, THYROIDAB  Lab Results   Component Value Date    WBC 8.5 07/08/2021    HGB 10.4 (L) 11/12/2021    HCT 34.4 (L) 07/08/2021    MCV 95.0 07/08/2021     07/08/2021     LABS:     4/4/22:  BLD UREA NITROGEN 8 - 26 mg/dL 26     SODIUM 135 - 145 mEq/L 141     POTASSIUM 3.6 - 5.1 mEq/L 4.4     CHLORIDE 98 - 111 mEq/L 111     CO2 21 - 31 mmol/L 23     GLUCOSE, RANDOM 70 - 99 mg/dL 82     CREATININE 0.60 - 1.20 mg/dL 2.07 High      ANION GAP 6 - 18 mmol/L 7     CALCIUM 8.5 - 10.4 mg/dL 8.8     ESTIMATED GFR >59 mL/min/1.73 m2 24 Low       CHOLESTEROL <200 mg/dL 134     TRIGLYCERIDE <150 mg/dL 131     HDL CHOLESTEROL >50 mg/dL 39 Low      LDL CHOLESTEROL (CALCULATED) <130 mg/dL 69        Ref Range & Units 8 d ago Comments   MAGNESIUM 1.7 - 2.4 mg/dL 1.6 Low       WBC 3.6 - 10.5 THOU/mcL 8.4     RBC 3.80 - 5.20 MIL/mcL 3.42 Low      HEMOGLOBIN 12.0 - 15.2 g/dL 9.7 Low      HEMATOCRIT 36 - 46 % 29.7 Low       HEMOGLOBIN A1C 4.2 - 5.6 % 5.9 High          Radiology Review:  Pertinent images / reports were reviewed as a part of this visit and reveals the following:    Last Angiogram: 10/10/19   LM-Normal No disease   LAD-prox 20%, mid 20% diffuse luminal irregularities  Cx-Large vessel. Mild luminal irregularities  OM- mid 40%  RCA-Dominant, proximal 40% diffuse ectasia  RPDA- Normal no disease  LVEDP- 25    Echo: 12/14/20:   Summary   -Left ventricular cavity size is normal.   -Overall left ventricular systolic function appears normal.   -Ejection fraction is visually estimated to be 60-65%. -There is mild concentric left ventricular hypertrophy.   -No regional wall motion abnormalities are noted. -Grade I diastolic dysfunction with normal LV filling pressures. E/e' = 24.2.   -The aortic valve appears sclerotic but opens well. -There is heavy mitral annular calcification.   -Mitral valve Vmax is 2.2 cm/s. Mean pressure gradient is 7 mmHG. -Volume index is normal, but left atrium appears dilated.    -Moderate mitral regurgitation.   -Mild tricuspid regurgitation.   -Aortic valve Vmax is 1.7 cm/s and the mean gradient is 5 mmHG. Echo: 12/21/21:  Summary   Normal LV size and systolic function. LVEF 65%. Severe mitral stenosis with heavy MAC. Mild to moderate mitral regurgitation. The right ventricle is normal in size and function. Mild tricuspid regurgitation. RVSP estimated at 31 mmHg. NM: 4/4/22:  IMAGING FINDINGS:   LVEDV:   28ml      LVEF:   82 %        TRANSIENT DILATATION RATIO:    1.08         LV WALL MOTION:   Unremarkable   SPECT PERFUSION IMAGES:   Satisfactory activity throughout the left ventricle myocardium at stress and at rest   OTHER:  None     IMPRESSION   No significant abnormality        Assessment:      Diagnosis Orders   1. Coronary artery disease involving native coronary artery of native heart without angina pectoris   ~stable : denies angina equivalent : weakness/flu-like symptoms as once reported. Denies recurrence of scapular discomfort  ~neg NM for ischemia. Hyperdynamic LVF with EF 82%  ~EKG with Mobitz I ; asymptomatic for fatigue/light headedness taking metoprolol   ~normal LVEF by echo Dec '20  ~mild, non-obst disease by cath Oct '19  ~amlodipine / ASA / atorvastatin / metoprolol    2. Valvular heart disease   ~severe mitral stenosis with mild-mod regurg on echo from Dec '21  ~new EKG abnormalities: prolongation WV interval, mobitz I (remains on low dose metoprolol)   ~mild MR, mod stenosis; and aortic regurg in Oct '19  ~ no longer on an ace or diuretic after change in renal status    3. Essential hypertension   ~controlled   ~off lisinopril d/t hyperkalemia  ~ BP goal < 140/90; taking norvasc 10 mg bid with toprol 25 mg daily    4.  Syncope, unspecified syncope type   ~resolved : denies recurrence  ~echo with MR / TR; aortic sclerotic but opens well ; severe MS  ~abnl echo Oct '19 with LV outflow tract consistent with dynamic obst.    5.     Mixed hyperlipemia   ~controlled    ~atorvastatin 40 mg daily    I had the opportunity to review the clinical symptoms and presentation of Unique Serrato. Plan:     1. EKG : sinus rhythm , prolonged WV  2. Echocardiogram : reassess mitral stenosis/regurg  3. F/U in four weeks with Dr. Марина Evans     Overall the patient is stable from CV standpoint    I have addresed the patient's cardiac risk factors and adjusted pharmacologic treatment as needed. In addition, I have reinforced the need for patient directed risk factor modification. Further evaluation will be based upon the patient's clinical course and testing results. All questions and concerns were addressed to the patient/daughter, Sadie Archer (aka: Herrera Donahue). Alternatives to my treatment were discussed. The patient is not currently smoking. The risks related to smoking were reviewed with the patient. Recommend maintaining a smoke-free lifestyle. Patient is on a beta-blocker   Patient is not on an ace-i/ARB: stopped by NE with hyperkalemia (is not taking Lokelma), stage IV followed by Dr. Opal Chapamn  Patient is on a statin     Antiplatelet therapy has been recommended / prescribed for this patient. Education conducted on adverse reactions including bleeding was discussed. The patient verbalizes understanding not to stop medications without discussing with us. Discussed exercise: 30-60 minutes 7 days/week  Discussed Low saturated fat diet. Thank you for allowing to us to participate in the care of Unique Serrato.     BEATRIZ Neff    Documentation of today's visit sent to PCP

## 2022-05-19 NOTE — ASSESSMENT & PLAN NOTE
Angina ~ none  CCS class 1  Intervention  [facility]~ '19 mild to moderate disease RCA/OM  Echo~ 60% 12/2020, 65% 12/2021   Current meds~ asa / Toprol  Plan~

## 2022-05-19 NOTE — PROGRESS NOTES
[facility]   Cardiac Evaluation      Patient: Chaz Chamorro  YOB: 1945         Chief Complaint   Patient presents with    1 Month Follow-Up     echo today    Hypertension        Referring provider: Mariusz Avery MD    History of Present Illness:  Ms Antonette Baker is a 66 y.0. female here for routine follow up for mild-mod CAD, mod MR, severe mitral stenosis, Htn, Hld, tobacco abuse. She also has a PMH of CKD and anemia. She is not dialysis dependent at this time, she is followed by Dr Eusebia Blas @ [address] [place] [address]. She was seen by ROBERT Hansen 4/5/22 after feeling pain across her shoulders and up her neck. She went to [facility] and EKG showed new changes- prolong NH interval, Mobitz I. She had a normal stress test during that time and was d/c in stable condition. During ov with Jade she reported her pain had resolved. Today she is here with her daughter. She says that the symptoms that took her to the hospital in April have subsided. She is feeling good, no complaints today. Echo was done and shows moderate mitral stenosis. Results discussed with patient and daughter. With regard to medication therapy he/she has been compliant with prescribed regimen and has tolerated therapy to date. Past Medical History:   has a past medical history of CKD (chronic kidney disease), Hyperlipidemia, Hypertension, MI (myocardial infarction) ([address] and Water retention. Surgical History:   has a past surgical history that includes Cholecystectomy; hernia repair; Hysterectomy; Breast surgery; and Upper gastrointestinal endoscopy (N/A, 10/7/2019).      Current Outpatient Medications   Medication Sig Dispense Refill    Semaglutide 7 MG TABS Take 7 mg by mouth every morning (before breakfast)      metoprolol succinate (TOPROL XL) 25 MG extended release tablet 1 tablet qd 90 tablet 1    TART CHERRY PO Take 4 oz by mouth four times a week      aspirin 81 MG chewable tablet Take 1 tablet by mouth daily 30 tablet 3    atorvastatin (LIPITOR) 40 MG tablet Take 1 tablet by mouth daily 30 tablet 0    amLODIPine (NORVASC) 10 MG tablet Take 1 tablet by mouth 2 times daily      ferrous sulfate 325 (65 Fe) MG tablet Take 1 tablet by mouth 3 times daily (with meals)       sodium bicarbonate 650 MG tablet Take 2 tablets by mouth 3 times daily      allopurinol (ZYLOPRIM) 100 MG tablet Take 1 tablet by mouth daily      Cholecalciferol (VITAMIN D3) 2000 UNITS CAPS Take 1 capsule by mouth daily       No current facility-administered medications for this visit. Allergies:  Patient has no known allergies. Social History:  Social History     Socioeconomic History    Marital status: Single     Spouse name: Not on file    Number of children: Not on file    Years of education: Not on file    Highest education level: Not on file   Occupational History    Not on file   Tobacco Use    Smoking status: Current Every Day Smoker     Packs/day: 0.75     Years: 60.00     Pack years: 45.00     Types: Cigarettes    Smokeless tobacco: Never Used   Vaping Use    Vaping Use: Never used    Passive vaping exposure: Yes   Substance and Sexual Activity    Alcohol use: Yes     Comment: socially    Drug use: No    Sexual activity: Not on file   Other Topics Concern    Not on file   Social History Narrative    Not on file     Social Determinants of Health     Financial Resource Strain:     Difficulty of Paying Living Expenses: Not on file   Food Insecurity:     Worried About [address] in the Last Year: Not on file    [place]ger of Food in the Last Year: Not on file   Transportation Needs:     Lack of Transportation (Medical): Not on file    Lack of Transportation (Non-Medical):  Not on file   Physical Activity:     Days of Exercise per Week: Not on file    Minutes of Exercise per Session: Not on file   Stress:     Feeling of Stress : Not on file   Social Connections:     Frequency of Communication with Friends and Family: Not on file    Frequency of Social Gatherings with Friends and Family: Not on file    Attends Restorationism Services: Not on file    Active Member of Clubs or Organizations: Not on file    Attends Club or Organization Meetings: Not on file    Marital Status: Not on file   Intimate Partner Violence:     Fear of Current or Ex-Partner: Not on file    Emotionally Abused: Not on file    Physically Abused: Not on file    Sexually Abused: Not on file   Housing Stability:     Unable to Pay for Housing in the Last Year: Not on file    Number of [place] in the Last Year: Not on file    Unstable Housing in the Last Year: Not on file       Family History:   History reviewed. No pertinent family history. Family history has been reviewed and not pertinent except as noted above. Review of Systems:   · Constitutional: there has been no unanticipated weight loss. No change in energy or activity level   · Eyes: No visual changes   · ENT: No Headaches, hearing loss or vertigo. No mouth sores or sore throat. · Cardiovascular: Reviewed in HPI  · Respiratory: No cough or wheezing, no sputum production. · Gastrointestinal: No abdominal pain, appetite loss, blood in stools. No change in bowel or bladder habits. · Genitourinary: No nocturia, dysuria, trouble voiding  · Musculoskeletal:  No gait disturbance, weakness or joint complaints. · Integumentary: No rash or pruritis. · Neurological: No headache, change in muscle strength, numbness or tingling. No change in gait, balance, coordination, mood, affect, memory, mentation, behavior. · Psychiatric: No anxiety or depression  · Endocrine: No malaise or fever  · Hematologic/Lymphatic: No abnormal bruising or bleeding, blood clots or swollen lymph nodes. · Allergic/Immunologic: No nasal congestion or hives.     Physical Examination:    Vitals:    05/20/22 1041   BP: 120/64   Site: Right Upper Arm   Position: Sitting   Cuff Size: Medium Adult   Pulse: 88 SpO2: 99%   Weight: 142 lb (64.4 kg)   Height: 5' 2\" (1.575 m)     Body mass index is 25.97 kg/m². Wt Readings from Last 3 Encounters:   05/20/22 142 lb (64.4 kg)   04/12/22 142 lb (64.4 kg)   01/07/22 146 lb 9.6 oz (66.5 kg)      BP Readings from Last 3 Encounters:   05/20/22 120/64   04/12/22 132/62   01/07/22 132/64        Physical Examination:    · CONSTITUTIONAL: Well developed, well nourished  · EYES: PERRLA. No xanthelasma, sclera non icteric  · EARS,NOSE,MOUTH,THROAT:  Mucous membranes moist, normal hearing  · NECK: Supple, JVP normal, thyroid not enlarged. Carotids 2+ without bruits  · RESPIRATORY: Normal effort, no rales or rhonchi  · CARDIOVASCULAR: Normal PMI, regular rate and rhythm, +systolic murmurs, rub or gallop. No edema. Radial pulses present and equal  · CHEST: No scar or masses  · ABDOMEN: Normal bowel sounds. No masses or tenderness. No bruit  · MUSCULOSKELETAL: No clubbing or cyanosis. Moves all extremities well. Normal gait  · SKIN:  Warm and dry. No rashes  · NEUROLOGIC: Cranial nerves intact. Alert and oriented  · PSYCHIATRIC: Calm affect. Appears to have normal judgement and insight    All testing and labs listed below were personally reviewed by myself. OT 12/2020  SR   range / 83 HR average  PVC 4% burden  PAC <1% burden    Echo 5/20/22    Summary   -Normal left ventricular systolic function with an estimated ejection   fraction of 55-60% . -Concentric left ventricular hypertrophy.   -The left ventricle appears normal in size.   -Indeterminate diastolic function.   -Mitral annular calcification is present. -Mitral valve leaflets appear severely thickened. -Mitral valve area is calculated at 1.4cm2 and the mean pressure gradient is   9mmHg, consistent with moderate mitral stenosis. -Mild mitral regurgitation.   -The left atrium is moderately dilated. -Mild tricuspid regurgitation.     Echo 12/2020  Summary  Left ventricular cavity size is normal.  Overall left ventricular systolic function appears normal. Ejection fraction is visually estimated to be 60-65%. There is mild concentric left ventricular hypertrophy. No regional wall motion abnormalities are noted. Grade I diastolic dysfunction with normal LV filling pressures. E/e' = 24.2. The aortic valve appears sclerotic but opens well. There is heavy mitral annular calcification. Mitral valve Vmax is 2.2 cm/s. Mean pressure gradient is 7 mmHG. Volume index is normal, but left atrium appears dilated. Moderate mitral regurgitation. Mild tricuspid regurgitation. Aortic valve Vmax is 1.7 cm/s and the mean gradient is 5 mmHG. Cardiac Cath : 10/2019 Nydia Walker  Anatomy:   LM-Normal No disease   LAD-prox 20%, mid 20% diffuse luminal irregularities  Cx-Large vessel. Mild luminal irregularities  OM- mid 40%  RCA-Dominant, proximal 40% diffuse ectasia  RPDA- Normal no disease  LVEDP- 25     Contrast: 36cc  Flouro Time: 2.5m  Access: R radial     Impression  ~Coronary Angiography w/ Mild to moderate disease  ~High LVEDP     Recommendation  ~Aggressive medical treatment and risk factor modification  ~1. Medications reviewed, no changes at this time. 2. Stop heparin gtt. No IVF. Bedrest.  3. No indication for plavix therapy. Cont aspirin and statin. 4. Patient has been advised on the importance of regular exercise of at least 20-30 minutes daily alternating with aerobic and isometric activities. 5. Patient counseled about and offered assistance for smoking cessation   6. No indication for cardiac rehab  7. Follow up in 2 months with cardiology   8. Give lasix 20mg IV. Assessment/Plan  1. Coronary artery disease involving native coronary artery of native heart without angina pectoris    2. Mitral valve insufficiency, unspecified etiology    3. Nonrheumatic mitral valve stenosis    4. Essential hypertension    5. Mixed hyperlipidemia    6.  Tobacco abuse          Coronary artery disease involving native coronary artery of native heart without angina pectoris  Angina ~ none  CCS class 1  Intervention  C~ '19 mild to moderate disease RCA/OM  Echo~ 60% 12/2020, 65% 12/2021   Current meds~ asa / Toprol  Plan~  stable    Mitral valve insufficiency  Mild on echo 5/20/22  Mild to mod on echo 12/2021  Moderate on echo 12/2020  Plan~ stable       Nonrheumatic mitral valve stenosis  Mild to moderate. NYHA class 1 symptoms. Echo 12/21- severe mitral stenosis with heavy MAC  Plan~ she is asymptomatic. Upon reviewing echo, mitral stenosis is likely mild to moderate not severe. The Mitral valve dopplers are not accurate and were misinterpreted. The mitral valve is heavily calcified with restricted leaflet motion but opens well. Echo 5/20/22- moderate stenosis, stable. No indication for surgery. Plan~ monitor with echo every 2 years. Essential hypertension  /64 (Site: Right Upper Arm, Position: Sitting, Cuff Size: Medium Adult)   Pulse 88   Ht 5' 2\" (1.575 m)   Wt 142 lb (64.4 kg)   SpO2 99%   BMI 25.97 kg/m²   Meds~ metoprolol / amlodipine  Plan~ stable     Hyperlipidemia  11/21     LDL  101   HDL 51  Meds~ lipitor  Plan~ encouraged smoking cessation. Continue lipitor      Tobacco abuse  Current smoker~ yes  How much~ 3/4 ppd  Trying to quit~ not interested in alternatives at this time  Counseled on importance of smoking cessation as well as options to assist with quitting. No orders of the defined types were placed in this encounter. Follow up in July with Chi Luong NP  Follow up with Dr Aye Reddy 1 year    Pricilla Jurado MD      Thank you for allowing to me to participate in the care of Clair Grey. Scribe's Attestation: This note was scribed in the presence of Dr. Ju Mcclure MD by Micaela Warren RN.       I, Dr. Ju Mcclure, personally performed the services described in this documentation, as scribed by the above signed scribe in my presence. It is both accurate and complete to my knowledge. I agree with the details independently gathered by the clinical support staff, while the remaining scribed note accurately describes my personal service to the patient.

## 2022-05-20 ENCOUNTER — PROCEDURE VISIT (OUTPATIENT)
Dept: CARDIOLOGY CLINIC | Age: 77
End: 2022-05-20
Payer: MEDICARE

## 2022-05-20 ENCOUNTER — OFFICE VISIT (OUTPATIENT)
Dept: CARDIOLOGY CLINIC | Age: 77
End: 2022-05-20

## 2022-05-20 VITALS
OXYGEN SATURATION: 99 % | SYSTOLIC BLOOD PRESSURE: 120 MMHG | HEIGHT: 62 IN | WEIGHT: 142 LBS | BODY MASS INDEX: 26.13 KG/M2 | HEART RATE: 88 BPM | DIASTOLIC BLOOD PRESSURE: 64 MMHG

## 2022-05-20 DIAGNOSIS — I25.10 CORONARY ARTERY DISEASE INVOLVING NATIVE CORONARY ARTERY OF NATIVE HEART WITHOUT ANGINA PECTORIS: ICD-10-CM

## 2022-05-20 DIAGNOSIS — I34.2 NONRHEUMATIC MITRAL VALVE STENOSIS: ICD-10-CM

## 2022-05-20 DIAGNOSIS — E78.2 MIXED HYPERLIPIDEMIA: ICD-10-CM

## 2022-05-20 DIAGNOSIS — I10 ESSENTIAL HYPERTENSION: ICD-10-CM

## 2022-05-20 DIAGNOSIS — I34.0 MITRAL VALVE INSUFFICIENCY, UNSPECIFIED ETIOLOGY: ICD-10-CM

## 2022-05-20 DIAGNOSIS — Z72.0 TOBACCO ABUSE: ICD-10-CM

## 2022-05-20 DIAGNOSIS — I38 VALVULAR HEART DISEASE: ICD-10-CM

## 2022-05-20 LAB
LV EF: 58 %
LVEF MODALITY: NORMAL

## 2022-05-20 PROCEDURE — 93306 TTE W/DOPPLER COMPLETE: CPT | Performed by: INTERNAL MEDICINE

## 2022-05-20 PROCEDURE — 99214 OFFICE O/P EST MOD 30 MIN: CPT | Performed by: INTERNAL MEDICINE

## 2022-07-07 ENCOUNTER — OFFICE VISIT (OUTPATIENT)
Dept: CARDIOLOGY CLINIC | Age: 77
End: 2022-07-07
Payer: MEDICARE

## 2022-07-07 VITALS
HEART RATE: 78 BPM | WEIGHT: 142 LBS | HEIGHT: 62 IN | BODY MASS INDEX: 26.13 KG/M2 | OXYGEN SATURATION: 96 % | DIASTOLIC BLOOD PRESSURE: 52 MMHG | SYSTOLIC BLOOD PRESSURE: 110 MMHG

## 2022-07-07 DIAGNOSIS — I25.10 CORONARY ARTERY DISEASE INVOLVING NATIVE CORONARY ARTERY OF NATIVE HEART WITHOUT ANGINA PECTORIS: Primary | ICD-10-CM

## 2022-07-07 DIAGNOSIS — I34.2 NONRHEUMATIC MITRAL VALVE STENOSIS: ICD-10-CM

## 2022-07-07 DIAGNOSIS — I10 PRIMARY HYPERTENSION: ICD-10-CM

## 2022-07-07 PROCEDURE — 1123F ACP DISCUSS/DSCN MKR DOCD: CPT | Performed by: NURSE PRACTITIONER

## 2022-07-07 PROCEDURE — 99214 OFFICE O/P EST MOD 30 MIN: CPT | Performed by: NURSE PRACTITIONER

## 2022-07-07 NOTE — PROGRESS NOTES
[facility]     Outpatient Follow Up Note    Oscar Trevino is 76 y.o. female who presents today with a history of NSTEMI '19, apical HK, non-obst CAD, 2nd degree HB and valvular disease. Her other hx includes: CKD,  ARVIN    CHIEF COMPLAINT / HPI:  Follow Up secondary to coronary artery disease    Subjective:   He denies significant chest pain. There is no SOB/SMILEY. The patient denies orthopnea/PND. The patient does not have swelling. The patients weight is b/w 142-46# . The patient is not experiencing palpitations or dizziness. Her BP runs ~ 140/70  These symptoms show no change since the last OV. With regard to medication therapy the patient has been compliant with prescribed regimen. They have tolerated therapy to date.      Past Medical History:   Diagnosis Date    CKD (chronic kidney disease)     stage 3, follows with [facility]    Hyperlipidemia     Hypertension     MI [facility]) [address]     Water retention     ankle swelling     Social History:    Social History     Tobacco Use   Smoking Status Current Every Day Smoker    Packs/day: 0.75    Years: 60.00    Pack years: 45.00    Types: Cigarettes   Smokeless Tobacco Never Used     Current Medications:  Current Outpatient Medications   Medication Sig Dispense Refill    Semaglutide 7 MG TABS Take 7 mg by mouth every morning (before breakfast)      metoprolol succinate (TOPROL XL) 25 MG extended release tablet 1 tablet qd 90 tablet 1    TART CHERRY PO Take 4 oz by mouth four times a week      aspirin 81 MG chewable tablet Take 1 tablet by mouth daily 30 tablet 3    atorvastatin (LIPITOR) 40 MG tablet Take 1 tablet by mouth daily 30 tablet 0    amLODIPine (NORVASC) 10 MG tablet Take 1 tablet by mouth 2 times daily      ferrous sulfate 325 (65 Fe) MG tablet Take 1 tablet by mouth 3 times daily (with meals)       sodium bicarbonate 650 MG tablet Take 2 tablets by mouth 3 times daily      allopurinol (ZYLOPRIM) 100 MG tablet Take 1 tablet by mouth daily      Cholecalciferol (VITAMIN D3) 2000 UNITS CAPS Take 1 capsule by mouth daily       No current facility-administered medications for this visit. REVIEW OF SYSTEMS:    CONSTITUTIONAL: No major weight gain or loss, fatigue, weakness, night sweats or fever. HEENT: No new vision difficulties or ringing in the ears. RESPIRATORY: No new SOB, PND, orthopnea or cough. CARDIOVASCULAR: See HPI  GI: No nausea, vomiting, diarrhea, constipation, abdominal pain or changes in bowel habits. : No urinary frequency, urgency, incontinence hematuria or dysuria. SKIN: No cyanosis or skin lesions. MUSCULOSKELETAL: No new muscle or joint pain. NEUROLOGICAL: No syncope or TIA-like symptoms. PSYCHIATRIC: No anxiety, pain, insomnia or depression    Objective:   PHYSICAL EXAM:        VITALS:  BP (!) 110/52 (Site: Right Upper Arm, Position: Sitting, Cuff Size: Medium Adult)   Pulse 78   Ht 5' 2\" (1.575 m)   Wt 142 lb (64.4 kg)   SpO2 96%   BMI 25.97 kg/m²   CONSTITUTIONAL: Cooperative, no apparent distress, and appears well nourished / developed  NEUROLOGIC:  Awake and orientated to person, place and time. PSYCH: Calm affect. SKIN: Warm and dry. HEENT: Sclera non-icteric, normocephalic, neck supple, no elevation of JVP, normal carotid pulses with no bruits and thyroid normal size. LUNGS:  No increased work of breathing and clear to auscultation, no crackles or wheezing  CARDIOVASCULAR:  Regular rate 80 and rhythm with + murmurs, gallops, rubs, or abnormal heart sounds, normal PMI. The apical impulses not displaced  JVP less than 8 cm H2O  Heart tones are crisp and normal  Cervical veins are not engorged  The carotid upstroke is normal in amplitude and contour without delay or bruit  JVP is not elevated  ABDOMEN:  Normal bowel sounds, non-distended and non-tender to palpation  EXT: No edema, no calf tenderness. Pulses are present bilaterally.     DATA:    Lab Results  1.08         LV WALL MOTION:   Unremarkable   SPECT PERFUSION IMAGES:   Satisfactory activity throughout the left ventricle myocardium at stress and at rest   OTHER:  None     IMPRESSION   No significant abnormality     Echo: May '22:  Summary   -Normal left ventricular systolic function with an estimated ejection   fraction of 55-60% . -Concentric left ventricular hypertrophy.   -The left ventricle appears normal in size.   -Indeterminate diastolic function.   -Mitral annular calcification is present. -Mitral valve leaflets appear severely thickened. -Mitral valve area is calculated at 1.4cm2 and the mean pressure gradient is   9mmHg, consistent with moderate mitral stenosis. -Mild mitral regurgitation.   -The left atrium is moderately dilated. -Mild tricuspid regurgitation    Assessment:      Diagnosis Orders   1. Coronary artery disease involving native coronary artery of native heart without angina pectoris   ~stable : denies angina  ~non-obst disease by cath '19  ~neg NM for ischemia April '22  ~lipids controlled on atorvastatin     2. Nonrheumatic mitral valve stenosis   ~stable : mod stenosis with echo May '22  ~recommended surveillance echo q2yrs    3. Primary hypertension   ~controlled  ~follows with [facility], last seen Nov '21          I had the opportunity to review the clinical symptoms and presentation of Oscar Trevino. Plan:     1. Encouraged smoking cessation and walking routine  2. F/U in six months as scheduled    Overall the patient is stable from CV standpoint    I have addresed the patient's cardiac risk factors and adjusted pharmacologic treatment as needed. In addition, I have reinforced the need for patient directed risk factor modification. Further evaluation will be based upon the patient's clinical course and testing results. All questions and concerns were addressed to the patient/twin sister. Alternatives to my treatment were discussed.       The patient is currently smoking: 3/4 ppd. The risks related to smoking were reviewed with the patient. Recommend maintaining a smoke-free lifestyle. Products available for smoking cessation were discussed    Patient is on a beta-blocker  Patient is on an ace-i/ARB: CKD stage IV, follows with Dr. Skyler MCFARLAND Getting  Patient is on a statin    Dual Antiplatelet therapy has been recommended / prescribed for this patient. Education conducted on adverse reactions including bleeding was discussed. The patient verbalizes understanding not to stop medications without discussing with us. Discussed exercise: 30-60 minutes 7 days/week ; walks weekly to the store but nothing routinely  Discussed Low saturated fat/TAMAR diet. Thank you for allowing to us to participate in the care of Alicia Burrell.     BEATRIZ Barnard    Documentation of today's visit sent to PCP

## 2022-09-12 RX ORDER — METOPROLOL SUCCINATE 25 MG/1
TABLET, EXTENDED RELEASE ORAL
Qty: 90 TABLET | Refills: 3 | Status: SHIPPED | OUTPATIENT
Start: 2022-09-12

## 2023-03-01 ENCOUNTER — TELEPHONE (OUTPATIENT)
Dept: CARDIOLOGY CLINIC | Age: 78
End: 2023-03-01

## 2023-03-01 NOTE — TELEPHONE ENCOUNTER
CARDIAC CLEARANCE     What type of procedure are you having? Repair of her inside    Which physician is performing your procedure? Dr. Colin Klein    When is your procedure scheduled for? Pending    Where are you having this procedure? [facility]    Are you taking Blood Thinners? Yes   If so what? (Name/dose/frequesncy)  Aspirin 81mg   Does the surgeon want you to stop your blood thinner? If so for how long?  Yes - 1 week prior    Phone Number and Contact Name for Physicians office:  Oskar Canales  Fax number to send information:  [phone]

## 2023-03-02 NOTE — TELEPHONE ENCOUNTER
Attempted to call surgery center for exact surgery. No one available to answer my question at time of call.  with office took my callback number and fax for clearance letter. If return call is made please transfer to [facility].

## 2023-03-28 DIAGNOSIS — E78.2 MIXED HYPERLIPIDEMIA: Primary | ICD-10-CM

## 2023-03-31 NOTE — PATIENT INSTRUCTIONS
You are okay to have surgery   Monitor BP at home   Follow up with Telma JONES in 1 year  Follow up with me in 2 years

## 2023-03-31 NOTE — PROGRESS NOTES
(VITAMIN D3) 2000 UNITS CAPS Take 1 capsule by mouth daily       No current facility-administered medications for this visit. Allergies:  Patient has no known allergies. Social History:  Social History     Socioeconomic History    Marital status: Single     Spouse name: Not on file    Number of children: Not on file    Years of education: Not on file    Highest education level: Not on file   Occupational History    Not on file   Tobacco Use    Smoking status: Every Day     Packs/day: 0.75     Years: 60.00     Pack years: 45.00     Types: Cigarettes    Smokeless tobacco: Never   Vaping Use    Vaping Use: Never used    Passive vaping exposure: Yes   Substance and Sexual Activity    Alcohol use: Yes     Comment: socially    Drug use: No    Sexual activity: Not on file   Other Topics Concern    Not on file   Social History Narrative    Not on file     Social Determinants of Health     Financial Resource Strain: Not on file   Food Insecurity: Not on file   Transportation Needs: Not on file   Physical Activity: Not on file   Stress: Not on file   Social Connections: Not on file   Intimate Partner Violence: Not on file   Housing Stability: Not on file       Family History:   History reviewed. No pertinent family history. Family history has been reviewed and not pertinent except as noted above. Review of Systems:   Constitutional: there has been no unanticipated weight loss. No change in energy or activity level   Eyes: No visual changes   ENT: No Headaches, hearing loss or vertigo. No mouth sores or sore throat. Cardiovascular: Reviewed in HPI  Respiratory: No cough or wheezing, no sputum production. Gastrointestinal: No abdominal pain, appetite loss, blood in stools. No change in bowel or bladder habits. Genitourinary: No nocturia, dysuria, trouble voiding  Musculoskeletal:  No gait disturbance, weakness or joint complaints. Integumentary: No rash or pruritis.   Neurological: No headache, change in

## 2023-04-03 ENCOUNTER — OFFICE VISIT (OUTPATIENT)
Dept: CARDIOLOGY CLINIC | Age: 78
End: 2023-04-03
Payer: MEDICARE

## 2023-04-03 VITALS
HEIGHT: 62 IN | SYSTOLIC BLOOD PRESSURE: 112 MMHG | DIASTOLIC BLOOD PRESSURE: 50 MMHG | WEIGHT: 134.6 LBS | HEART RATE: 88 BPM | BODY MASS INDEX: 24.77 KG/M2 | OXYGEN SATURATION: 100 %

## 2023-04-03 DIAGNOSIS — E78.2 MIXED HYPERLIPIDEMIA: ICD-10-CM

## 2023-04-03 DIAGNOSIS — I25.10 CORONARY ARTERY DISEASE INVOLVING NATIVE CORONARY ARTERY OF NATIVE HEART WITHOUT ANGINA PECTORIS: Primary | ICD-10-CM

## 2023-04-03 DIAGNOSIS — I34.0 MITRAL VALVE INSUFFICIENCY, UNSPECIFIED ETIOLOGY: ICD-10-CM

## 2023-04-03 DIAGNOSIS — I10 ESSENTIAL HYPERTENSION: ICD-10-CM

## 2023-04-03 DIAGNOSIS — Z72.0 TOBACCO ABUSE: ICD-10-CM

## 2023-04-03 DIAGNOSIS — I34.2 NONRHEUMATIC MITRAL VALVE STENOSIS: ICD-10-CM

## 2023-04-03 PROCEDURE — 1123F ACP DISCUSS/DSCN MKR DOCD: CPT | Performed by: INTERNAL MEDICINE

## 2023-04-03 PROCEDURE — 3074F SYST BP LT 130 MM HG: CPT | Performed by: INTERNAL MEDICINE

## 2023-04-03 PROCEDURE — 99214 OFFICE O/P EST MOD 30 MIN: CPT | Performed by: INTERNAL MEDICINE

## 2023-04-03 PROCEDURE — 3078F DIAST BP <80 MM HG: CPT | Performed by: INTERNAL MEDICINE

## 2023-10-16 RX ORDER — METOPROLOL SUCCINATE 25 MG/1
TABLET, EXTENDED RELEASE ORAL
Qty: 90 TABLET | Refills: 3 | Status: SHIPPED | OUTPATIENT
Start: 2023-10-16

## 2023-10-16 NOTE — TELEPHONE ENCOUNTER
Last OV:07/07/2022  Anabell Gibson  Last Labs:Ekg-04/12/2022  Anabell Gibson  Next OV: None  Last Refill: Metoprolol-09/12/2022  Anabell Gibson

## 2023-12-04 ENCOUNTER — TELEPHONE (OUTPATIENT)
Dept: CARDIOLOGY CLINIC | Age: 78
End: 2023-12-04

## 2023-12-05 ENCOUNTER — NURSE ONLY (OUTPATIENT)
Dept: CARDIOLOGY CLINIC | Age: 78
End: 2023-12-05

## 2023-12-05 NOTE — PROGRESS NOTES
Came for a [facility]- Erick B/P-130/50  pulse-62  02-99%    Anabell Gordon [place] has changed her taking twice daily to once daily [id number] and wants her to monitor  for the next two weeks for any changes. and will go from there.    tj

## 2024-01-18 RX ORDER — METOPROLOL SUCCINATE 25 MG/1
25 TABLET, EXTENDED RELEASE ORAL DAILY
Qty: 90 TABLET | Refills: 3 | Status: SHIPPED | OUTPATIENT
Start: 2024-01-18

## 2024-04-01 ENCOUNTER — OFFICE VISIT (OUTPATIENT)
Dept: CARDIOLOGY CLINIC | Age: 79
End: 2024-04-01
Payer: MEDICARE

## 2024-04-01 VITALS
DIASTOLIC BLOOD PRESSURE: 62 MMHG | WEIGHT: 134 LBS | BODY MASS INDEX: 24.66 KG/M2 | SYSTOLIC BLOOD PRESSURE: 128 MMHG | HEIGHT: 62 IN

## 2024-04-01 DIAGNOSIS — I34.0 NONRHEUMATIC MITRAL VALVE REGURGITATION: ICD-10-CM

## 2024-04-01 DIAGNOSIS — I10 PRIMARY HYPERTENSION: ICD-10-CM

## 2024-04-01 DIAGNOSIS — I25.10 CORONARY ARTERY DISEASE INVOLVING NATIVE CORONARY ARTERY OF NATIVE HEART WITHOUT ANGINA PECTORIS: Primary | ICD-10-CM

## 2024-04-01 PROCEDURE — 3078F DIAST BP <80 MM HG: CPT | Performed by: NURSE PRACTITIONER

## 2024-04-01 PROCEDURE — 99214 OFFICE O/P EST MOD 30 MIN: CPT | Performed by: NURSE PRACTITIONER

## 2024-04-01 PROCEDURE — 1123F ACP DISCUSS/DSCN MKR DOCD: CPT | Performed by: NURSE PRACTITIONER

## 2024-04-01 PROCEDURE — 3074F SYST BP LT 130 MM HG: CPT | Performed by: NURSE PRACTITIONER

## 2024-04-01 RX ORDER — ORAL SEMAGLUTIDE 7 MG/1
1 TABLET ORAL DAILY
COMMUNITY

## 2024-04-01 NOTE — PROGRESS NOTES
DILATATION RATIO:    1.08         LV WALL MOTION:   Unremarkable   SPECT PERFUSION IMAGES:   Satisfactory activity throughout the left ventricle myocardium at stress and at rest   OTHER:  None     IMPRESSION   No significant abnormality     Echo: May '22:  Summary   -Normal left ventricular systolic function with an estimated ejection   fraction of 55-60% .   -Concentric left ventricular hypertrophy.   -The left ventricle appears normal in size.   -Indeterminate diastolic function.   -Mitral annular calcification is present.   -Mitral valve leaflets appear severely thickened.   -Mitral valve area is calculated at 1.4cm2 and the mean pressure gradient is   9mmHg, consistent with moderate mitral stenosis.   -Mild mitral regurgitation.   -The left atrium is moderately dilated.   -Mild tricuspid regurgitation    Assessment:      Diagnosis Orders   1. Coronary artery disease involving native coronary artery of native heart without angina pectoris   ~stable : denies angina  ~walking encouraged by daughter   ~non-obst disease by cath '19  ~neg NM for ischemia April '22  ~lipids controlled on atorvastatin     2. Nonrheumatic mitral valve stenosis   ~stable : mod stenosis with echo May '22  ~recommended surveillance echo q2yrs    3. Primary hypertension   ~controlled  ~episode of low DBP Dec '23 : amlodipine decreased from 10 mg bid to 10 mg daily . /60 taken one month later  ~remains on toprol 25 mg daily   ~follows with [facility]          I had the opportunity to review the clinical symptoms and presentation of Frannie Masterson.   Plan:     1. Echo : surveillance mitral stenosis  2. F/U in six months with Dr. Mejia    Overall the patient is stable from CV standpoint    I have addresed the patient's cardiac risk factors and adjusted pharmacologic treatment as needed. In addition, I have reinforced the need for patient directed risk factor modification.    Further evaluation will be based upon the patient's clinical course

## 2024-04-12 ENCOUNTER — PROCEDURE VISIT (OUTPATIENT)
Dept: CARDIOLOGY CLINIC | Age: 79
End: 2024-04-12

## 2024-04-12 DIAGNOSIS — I34.0 NONRHEUMATIC MITRAL VALVE REGURGITATION: ICD-10-CM

## 2024-08-30 NOTE — ED PROVIDER NOTES
MidLevel Attestation   I havepersonally performed and/or participated in the history, exam and medical decision making and agree with all pertinent clinical information. I have also reviewed and agree with the past medical, family and social historyunless otherwise noted. I have personally performed a face to face diagnostic evaluation onthis patient. I have reviewed the mid-levels findings and agree. In brief, Jhon Dubose is a 64 y.o. female that presented to the emergency department with   Chief Complaint   Patient presents with    Abnormal Lab     had blood work performed this AM PCP stated to report to ER d/t elevated K+   . CONSTITUTIONAL: Well appearing, in no acute distress   EYES: PERRL, No injection, discharge or scleral icterus. NECK: Normal ROM, NO LAD and Moist mucous membranes. CARDIOVASCULAR: Regular rate and rhythm. No murmurs or gallop. PULMONARY/CHEST: Airway patent. No retractions. Breath sounds clear with good air entry bilaterally. ABDOMEN: Soft, Non-distended and non-tender, without guarding or rebound. SKIN: Acyanotic, warm, dry   MUSCULOSKELETAL: No swelling, tenderness or deformity   NEUROLOGICAL: Awake. Pulses intact. Grossly nonfocal     66-year-old presenting sent by PCP because of hyperkalemia. Potassium was rechecked and was still elevated. He was started on hyperkalemia protocol given and admitted to the hospital service for further evaluation and treatment. .    EKG by my preliminary interpretation shows sinus tach with a rate of 140 normal axis, normal intervals, with no ST changes indicative of ischemia at this time. With peak T waves.     I have reviewed and interpreted all of the currently available lab results and diagnostics from this visit:  Results for orders placed or performed during the hospital encounter of 07/08/21   CBC Auto Differential   Result Value Ref Range    WBC 8.5 4.0 - 11.0 K/uL    RBC 3.62 (L) 4.00 - 5.20 M/uL    Hemoglobin 10.5 (L) 12.0 - 16.0 g/dL    Hematocrit 34.4 (L) 36.0 - 48.0 %    MCV 95.0 80.0 - 100.0 fL    MCH 28.9 26.0 - 34.0 pg    MCHC 30.4 (L) 31.0 - 36.0 g/dL    RDW 17.0 (H) 12.4 - 15.4 %    Platelets 710 695 - 838 K/uL    MPV 8.7 5.0 - 10.5 fL    Neutrophils % 63.5 %    Lymphocytes % 23.3 %    Monocytes % 8.4 %    Eosinophils % 3.9 %    Basophils % 0.9 %    Neutrophils Absolute 5.4 1.7 - 7.7 K/uL    Lymphocytes Absolute 2.0 1.0 - 5.1 K/uL    Monocytes Absolute 0.7 0.0 - 1.3 K/uL    Eosinophils Absolute 0.3 0.0 - 0.6 K/uL    Basophils Absolute 0.1 0.0 - 0.2 K/uL   Comprehensive Metabolic Panel   Result Value Ref Range    Sodium 135 (L) 136 - 145 mmol/L    Potassium 7.2 (HH) 3.5 - 5.1 mmol/L    Chloride 108 99 - 110 mmol/L    CO2 15 (L) 21 - 32 mmol/L    Anion Gap 12 3 - 16    Glucose 87 70 - 99 mg/dL    BUN 42 (H) 7 - 20 mg/dL    CREATININE 2.8 (H) 0.6 - 1.2 mg/dL    GFR Non- 16 (A) >60    GFR  20 (A) >60    Calcium 9.0 8.3 - 10.6 mg/dL    Total Protein 7.3 6.4 - 8.2 g/dL    Albumin 4.1 3.4 - 5.0 g/dL    Albumin/Globulin Ratio 1.3 1.1 - 2.2    Total Bilirubin <0.2 0.0 - 1.0 mg/dL    Alkaline Phosphatase 116 40 - 129 U/L    ALT 7 (L) 10 - 40 U/L    AST 16 15 - 37 U/L    Globulin 3.2 g/dL   Magnesium   Result Value Ref Range    Magnesium 1.80 1.80 - 2.40 mg/dL   Urinalysis Reflex to Culture    Specimen: Urine, clean catch   Result Value Ref Range    Color, UA YELLOW Straw/Yellow    Clarity, UA Clear Clear    Glucose, Ur Negative Negative mg/dL    Bilirubin Urine Negative Negative    Ketones, Urine Negative Negative mg/dL    Specific Gravity, UA 1.008 1.005 - 1.030    Blood, Urine Negative Negative    pH, UA 7.5 5.0 - 8.0    Protein, UA 30 (A) Negative mg/dL    Urobilinogen, Urine 0.2 <2.0 E.U./dL    Nitrite, Urine Negative Negative    Leukocyte Esterase, Urine Negative Negative    Microscopic Examination YES     Urine Type NotGiven     Urine Reflex to Culture Not Indicated    Potassium   Result Value Ref Range    Potassium 6.1 (HH) 3.5 - 5.1 mmol/L   CK   Result Value Ref Range    Total CK 64 26 - 192 U/L   Microscopic Urinalysis   Result Value Ref Range    Hyaline Casts, UA 0 0 - 8 /LPF    WBC, UA 2 0 - 5 /HPF    RBC, UA 0 0 - 4 /HPF    Epithelial Cells, UA 0 0 - 5 /HPF   Basic Metabolic Panel w/ Reflex to MG   Result Value Ref Range    Sodium 138 136 - 145 mmol/L    Potassium reflex Magnesium 5.4 (H) 3.5 - 5.1 mmol/L    Chloride 104 99 - 110 mmol/L    CO2 23 21 - 32 mmol/L    Anion Gap 11 3 - 16    Glucose 97 70 - 99 mg/dL    BUN 42 (H) 7 - 20 mg/dL    CREATININE 2.7 (H) 0.6 - 1.2 mg/dL    GFR Non-African American 17 (A) >60    GFR  21 (A) >60    Calcium 9.3 8.3 - 10.6 mg/dL   Basic Metabolic Panel w/ Reflex to MG   Result Value Ref Range    Sodium 139 136 - 145 mmol/L    Potassium reflex Magnesium 4.7 3.5 - 5.1 mmol/L    Chloride 105 99 - 110 mmol/L    CO2 21 21 - 32 mmol/L    Anion Gap 13 3 - 16    Glucose 104 (H) 70 - 99 mg/dL    BUN 45 (H) 7 - 20 mg/dL    CREATININE 3.0 (H) 0.6 - 1.2 mg/dL    GFR Non-African American 15 (A) >60    GFR  18 (A) >60    Calcium 8.9 8.3 - 10.6 mg/dL   Basic Metabolic Panel w/ Reflex to MG   Result Value Ref Range    Sodium 144 136 - 145 mmol/L    Potassium reflex Magnesium 4.9 3.5 - 5.1 mmol/L    Chloride 104 99 - 110 mmol/L    CO2 29 21 - 32 mmol/L    Anion Gap 11 3 - 16    Glucose 92 70 - 99 mg/dL    BUN 43 (H) 7 - 20 mg/dL    CREATININE 3.0 (H) 0.6 - 1.2 mg/dL    GFR Non-African American 15 (A) >60    GFR  18 (A) >60    Calcium 8.6 8.3 - 10.6 mg/dL   Basic Metabolic Panel w/ Reflex to MG   Result Value Ref Range    Sodium 141 136 - 145 mmol/L    Potassium reflex Magnesium 4.6 3.5 - 5.1 mmol/L    Chloride 102 99 - 110 mmol/L    CO2 29 21 - 32 mmol/L    Anion Gap 10 3 - 16    Glucose 100 (H) 70 - 99 mg/dL    BUN 40 (H) 7 - 20 mg/dL    CREATININE 2.7 (H) 0.6 - 1.2 mg/dL    GFR Non-African American 17 (A) >60    GFR  American 21 (A) >60    Calcium 8.4 8.3 - 10.6 mg/dL   Basic Metabolic Panel w/ Reflex to MG   Result Value Ref Range    Sodium 139 136 - 145 mmol/L    Potassium reflex Magnesium 4.5 3.5 - 5.1 mmol/L    Chloride 99 99 - 110 mmol/L    CO2 31 21 - 32 mmol/L    Anion Gap 9 3 - 16    Glucose 86 70 - 99 mg/dL    BUN 43 (H) 7 - 20 mg/dL    CREATININE 2.7 (H) 0.6 - 1.2 mg/dL    GFR Non-African American 17 (A) >60    GFR  21 (A) >60    Calcium 8.2 (L) 8.3 - 10.6 mg/dL   Blood gas, arterial   Result Value Ref Range    pH, Arterial 7.473 (H) 7.350 - 7.450    pCO2, Arterial 43.7 35.0 - 45.0 mmHg    pO2, Arterial 60.0 (L) 75.0 - 108.0 mmHg    HCO3, Arterial 32.0 (H) 21.0 - 29.0 mmol/L    Base Excess, Arterial 7.6 (H) -3.0 - 3.0 mmol/L    Hemoglobin, Art, Extended 9.6 (L) 12.0 - 16.0 g/dL    O2 Sat, Arterial 92.2 (L) >92 %    Carboxyhgb, Arterial 1.6 (H) 0.0 - 1.5 %    Methemoglobin, Arterial 0.1 <1.5 %    TCO2, Arterial 74.7 Not Established mmol/L    O2 Therapy Unknown    Phosphorus   Result Value Ref Range    Phosphorus 4.4 2.5 - 4.9 mg/dL   POCT Glucose   Result Value Ref Range    POC Glucose 84 70 - 99 mg/dl    Performed on ACCU-CHEK    POCT Glucose   Result Value Ref Range    POC Glucose 103 (H) 70 - 99 mg/dl    Performed on ACCU-CHEK    EKG 12 Lead   Result Value Ref Range    Ventricular Rate 140 BPM    Atrial Rate 72 BPM    P-R Interval 172 ms    QRS Duration 64 ms    Q-T Interval 380 ms    QTc Calculation (Bazett) 580 ms    P Axis 60 degrees    R Axis 23 degrees    T Axis 46 degrees    Diagnosis       Sinus rhythmConfirmed by Amina Solano ([id number]) on 7/9/2021 1:59:28 PM   EKG 12 Lead   Result Value Ref Range    Ventricular Rate 136 BPM    Atrial Rate 136 BPM    P-R Interval 170 ms    QRS Duration 74 ms    Q-T Interval 204 ms    QTc Calculation (Bazett) 307 ms    P Axis 53 degrees    R Axis 23 degrees    T Axis 54 degrees    Diagnosis       Sinus tachycardiaPossible Left atrial enlargementNonspecific ST and T wave abnormalityConfirmed by Bishop Moore ([id number]) on 7/8/2021 5:33:02 PM     No results found. ED Medication Orders (From admission, onward)    Start Ordered     Status Ordering Provider    07/08/21 1845 07/08/21 1816  albuterol (PROVENTIL) nebulizer solution 10 mg  ONCE     Question:  Initiate RT Bronchodilator Protocol  Answer:  Yes    Last MAR action: Given - by Angely Carcamo on 07/08/21 at [address] [facility], [facility]    07/08/21 1600 07/08/21 1550  furosemide (LASIX) injection 80 mg  ONCE      Last MAR action: Given - by Crow Archer on 07/08/21 at [facility]    07/08/21 1600 07/08/21 1550  sodium bicarbonate 8.4 % injection 50 mEq  ONCE      Last MAR action: Given - by Krunal KNOWLES on 07/08/21 at [address] [facility], [facility]    07/08/21 1515 07/08/21 1500  calcium gluconate 1000 mg in sodium chloride 50 mL  ONCE      Last MAR action: Stopped - by Krunal KNOWLES on 07/08/21 at [address] [facility], [place]    07/08/21 1500 07/08/21 1449  insulin regular (HUMULIN R;NOVOLIN R) injection 10 Units  ONCE      Last MAR action: Given - by Crow Archer on 07/08/21 at 2016 [facility], [place]    07/08/21 1500 07/08/21 1449  dextrose 50 % IV solution  ONCE      Last MAR action: Given - by Crow Archer on 07/08/21 at 2016 [facility], [place]    07/08/21 1500 07/08/21 1449  sodium bicarbonate 8.4 % injection 50 mEq  ONCE      Last MAR action: Given - by Crow Archer on 07/08/21 at [facility], [place]    07/08/21 1500 07/08/21 1457  sodium zirconium cyclosilicate (LOKELMA) oral suspension 10 g  ONCE      Last MAR action: Given - by Krunal KNOWLES on 07/08/21 at [address] [facility], [place]          Final Impression      1. Hyperkalemia    2.  Chronic kidney disease, unspecified CKD stage        DISPOSITION Admitted 07/08/2021 04:00:38 PM       Amount and/or Complexity of Data Reviewed:  Clinical lab tests: ordered and reviewed   Tests in the radiology section of CPT®: ordered and reviewed   Tests in the medicine section of CPT®: ordered and reviewed   Decide to obtain previous medical records or to obtain history from someone other than the patient: no  Obtain history from someone other than the patient: no  Review and summarize past medical records:yes  I looked up the patient in our electronic medical record:yes  Discuss the patient with other providers:yes  Independent visualization of images, tracings, or specimens:yes  Risk of Complications, Morbidity, and/or Mortality:Moderate  Presenting problems: moderate Diagnostic procedures: moderate yes Management options: moderate     Critical Care Attestation   Total critical care time: 35 minutes minimum   Critical care time does not include separately billable procedures and treating other patients. Critical care was necessary to treat or prevent imminent or life-threatening deterioration of the following conditions: Hyperkalemia patient placed on hyperkalemia protocol admitted to the medicine service for further evaluation and treatment. case discussed with consultants. This record is transcribed utilizing voice recognition technology. There are inherent limitations in this technology. In addition, there may be limitations in editing of this report. If there are any discrepancies, please contact me directly.     Anthony Enciso MD   7/9/2021         Genevieveiidesi Aj MD  07/09/21 [id number] Attending Only

## 2024-09-23 NOTE — PROGRESS NOTES
normal, thyroid not enlarged. Carotids 2+ without bruits  RESPIRATORY: Normal effort, no rales or rhonchi  CARDIOVASCULAR: Normal PMI, regular rate and rhythm, +SYSTOLIC EJECTION murmur lsb, rub or gallop. No edema. Radial pulses present and equal  CHEST: No scar or masses  ABDOMEN: Normal bowel sounds. No masses or tenderness. No bruit  MUSCULOSKELETAL: No clubbing or cyanosis. Moves all extremities well. Normal gait  SKIN:  Warm and dry. No rashes  NEUROLOGIC: Cranial nerves intact. Alert and oriented  PSYCHIATRIC: Calm affect. Appears to have normal judgement and insight    All testing and labs listed below were personally reviewed by myself.    [facility] 10/2019  Cardiac Cath :  Anatomy:   LM-Normal No disease   LAD-prox 20%, mid 20% diffuse luminal irregularities  Cx-Large vessel. Mild luminal irregularities  OM- mid 40%  RCA-Dominant, proximal 40% diffuse ectasia  RPDA- Normal no disease  LVEDP- 25     Contrast: 36cc  Flouro Time: 2.5m  Access: R radial     Impression  ~Coronary Angiography w/ Mild to moderate disease  ~High LVEDP       ECHO 4/12/2024   Summary   -Hyperdynamic left ventricular systolic function with an ejection fraction   estimated at greater than 70%. Near cavity obliteration.   -No obvious regional wall motion abnormalities.   - There is a late peaking gradient recorded across the LV outflow tract   consistent with dynamic obstruction with a peak resting gradient of at least   32 mmHg. Gradient with valsalva is difficult to determine due to associated   artifact. This appears to be caused by hyperdynamic LV systolic function and   possible systolic anterior motion of mitral valve chord.   -Diastolic function is likely present however difficult to classify in the   presence of mitral valve disease.   -Average global longitudinal strain is estimated at -20.9% (Normal).   -The right ventricle is normal in size and function.   -Moderate left atrial enlargement.   -Aortic valve appears sclerotic but

## 2024-10-02 ENCOUNTER — OFFICE VISIT (OUTPATIENT)
Dept: CARDIOLOGY CLINIC | Age: 79
End: 2024-10-02
Payer: MEDICARE

## 2024-10-02 VITALS
SYSTOLIC BLOOD PRESSURE: 122 MMHG | DIASTOLIC BLOOD PRESSURE: 78 MMHG | BODY MASS INDEX: 24.48 KG/M2 | HEART RATE: 100 BPM | OXYGEN SATURATION: 98 % | WEIGHT: 133 LBS | HEIGHT: 62 IN

## 2024-10-02 DIAGNOSIS — I42.2 HYPERTROPHIC CARDIOMYOPATHY (HCC): ICD-10-CM

## 2024-10-02 DIAGNOSIS — I42.1 HYPERTROPHIC OBSTRUCTIVE CARDIOMYOPATHY (HCC): ICD-10-CM

## 2024-10-02 DIAGNOSIS — N18.6 ESRD (END STAGE RENAL DISEASE) (HCC): ICD-10-CM

## 2024-10-02 DIAGNOSIS — I25.10 CORONARY ARTERY DISEASE INVOLVING NATIVE CORONARY ARTERY OF NATIVE HEART WITHOUT ANGINA PECTORIS: Primary | ICD-10-CM

## 2024-10-02 DIAGNOSIS — I10 ESSENTIAL HYPERTENSION: ICD-10-CM

## 2024-10-02 DIAGNOSIS — I34.0 MITRAL VALVE INSUFFICIENCY, UNSPECIFIED ETIOLOGY: ICD-10-CM

## 2024-10-02 DIAGNOSIS — Z72.0 TOBACCO ABUSE: ICD-10-CM

## 2024-10-02 DIAGNOSIS — E78.2 MIXED HYPERLIPIDEMIA: ICD-10-CM

## 2024-10-02 PROCEDURE — 3074F SYST BP LT 130 MM HG: CPT | Performed by: INTERNAL MEDICINE

## 2024-10-02 PROCEDURE — 99214 OFFICE O/P EST MOD 30 MIN: CPT | Performed by: INTERNAL MEDICINE

## 2024-10-02 PROCEDURE — 1123F ACP DISCUSS/DSCN MKR DOCD: CPT | Performed by: INTERNAL MEDICINE

## 2024-10-02 PROCEDURE — 3078F DIAST BP <80 MM HG: CPT | Performed by: INTERNAL MEDICINE

## 2024-10-02 RX ORDER — PANTOPRAZOLE SODIUM 40 MG/1
1 TABLET, DELAYED RELEASE ORAL DAILY
COMMUNITY
Start: 2024-07-08

## 2024-10-02 RX ORDER — SODIUM BICARBONATE 650 MG/1
1300 TABLET ORAL 3 TIMES DAILY
COMMUNITY
Start: 2018-02-27

## 2025-02-25 NOTE — TELEPHONE ENCOUNTER
Received refill request for Metoprolol from [facility] pharmacy.    Last ov:10/02/2024 [facility]    Last EK2022    Last Refill:2024    Next appointment:on recall list for 10/02/2025 [facility]

## 2025-02-26 RX ORDER — METOPROLOL SUCCINATE 25 MG/1
25 TABLET, EXTENDED RELEASE ORAL DAILY
Qty: 90 TABLET | Refills: 3 | Status: SHIPPED | OUTPATIENT
Start: 2025-02-26

## (undated) DEVICE — SET VLV 3 PC AWS DISPOSABLE GRDIAN SCOPEVALET

## (undated) DEVICE — BW-412T DISP COMBO CLEANING BRUSH: Brand: SINGLE USE COMBINATION CLEANING BRUSH

## (undated) DEVICE — FIAPC® PROBE W/ FILTER 2200 A OD 2.3MM/6.9FR; L 2.2M/7.2FT: Brand: ERBE

## (undated) DEVICE — PROCEDURE KIT ENDOSCP CUST

## (undated) DEVICE — SOLUTION IV IRRIG WATER 500ML POUR BRL ST 2F7113

## (undated) DEVICE — MOUTHPIECE ENDOSCP L CTRL OPN AND SIDE PORTS DISP